# Patient Record
Sex: FEMALE | Race: BLACK OR AFRICAN AMERICAN | NOT HISPANIC OR LATINO
[De-identification: names, ages, dates, MRNs, and addresses within clinical notes are randomized per-mention and may not be internally consistent; named-entity substitution may affect disease eponyms.]

---

## 2019-04-19 ENCOUNTER — ASOB RESULT (OUTPATIENT)
Age: 24
End: 2019-04-19

## 2019-04-19 ENCOUNTER — APPOINTMENT (OUTPATIENT)
Dept: ANTEPARTUM | Facility: CLINIC | Age: 24
End: 2019-04-19
Payer: MEDICAID

## 2019-04-19 PROBLEM — Z00.00 ENCOUNTER FOR PREVENTIVE HEALTH EXAMINATION: Status: ACTIVE | Noted: 2019-04-19

## 2019-04-19 PROCEDURE — 76813 OB US NUCHAL MEAS 1 GEST: CPT

## 2019-04-19 PROCEDURE — 76801 OB US < 14 WKS SINGLE FETUS: CPT

## 2019-06-18 ENCOUNTER — APPOINTMENT (OUTPATIENT)
Dept: ANTEPARTUM | Facility: CLINIC | Age: 24
End: 2019-06-18

## 2019-06-18 ENCOUNTER — ASOB RESULT (OUTPATIENT)
Age: 24
End: 2019-06-18

## 2019-11-03 ENCOUNTER — OUTPATIENT (OUTPATIENT)
Dept: OUTPATIENT SERVICES | Facility: HOSPITAL | Age: 24
LOS: 1 days | End: 2019-11-03
Payer: MEDICAID

## 2019-11-03 ENCOUNTER — OUTPATIENT (OUTPATIENT)
Dept: INPATIENT UNIT | Facility: HOSPITAL | Age: 24
LOS: 1 days | Discharge: HOME | End: 2019-11-03
Payer: MEDICAID

## 2019-11-03 VITALS — HEART RATE: 100 BPM | SYSTOLIC BLOOD PRESSURE: 119 MMHG | DIASTOLIC BLOOD PRESSURE: 71 MMHG

## 2019-11-03 VITALS
SYSTOLIC BLOOD PRESSURE: 119 MMHG | DIASTOLIC BLOOD PRESSURE: 71 MMHG | TEMPERATURE: 98 F | RESPIRATION RATE: 17 BRPM | HEART RATE: 100 BPM

## 2019-11-03 DIAGNOSIS — Z3A.00 WEEKS OF GESTATION OF PREGNANCY NOT SPECIFIED: ICD-10-CM

## 2019-11-03 DIAGNOSIS — O26.899 OTHER SPECIFIED PREGNANCY RELATED CONDITIONS, UNSPECIFIED TRIMESTER: ICD-10-CM

## 2019-11-03 PROCEDURE — 59025 FETAL NON-STRESS TEST: CPT | Mod: 26

## 2019-11-03 PROCEDURE — G0463: CPT

## 2019-11-03 NOTE — OB PROVIDER TRIAGE NOTE - NSHPPHYSICALEXAM_GEN_ALL_CORE
Vital Signs Last 24 Hrs  T(C): 36.9 (03 Nov 2019 21:23), Max: 36.9 (03 Nov 2019 21:23)  T(F): 98.4 (03 Nov 2019 21:23), Max: 98.4 (03 Nov 2019 21:23)  HR: 100 (03 Nov 2019 21:39) (100 - 100)  BP: 119/71 (03 Nov 2019 21:39) (119/71 - 119/71)  RR: 17 (03 Nov 2019 21:23) (17 - 17)    Abdomen soft, nontender     SSE copious pink tinged discharge noted, cervix appears dilated, no active bleeding noted     SVE 1/50/-3    Cat 1 tracing   irreg ctx by toco    TAS cephalic presentation, posterior placenta, michelle 8.64 cm, bpp 10/10

## 2019-11-03 NOTE — OB PROVIDER TRIAGE NOTE - NSOBPROVIDERNOTE_OBGYN_ALL_OB_FT
d/w Dr. Smyth   -Discharge home   -Keep scheduled appt 11/4/19   -Fetal kick count reviewed   -Labor precautions reviewed

## 2019-11-03 NOTE — OB PROVIDER TRIAGE NOTE - HISTORY OF PRESENT ILLNESS
23 yo  40 2/7 weeks with complaints of vaginal spotting x 3. Pt reports pink discharge when she wipes/in toilet. Pt reports going to [facility] yesterday for white vaginal discharge, was ruled out for rupture. Pt reports decreased fetal movements. Denies lof or ctx.     Current a/p complications: Denies     Allergies: NKDA  Medications: PNV  PMH: Denies   PSH: Denies   OB/GYN: Denies   Social: Denies   Psychosocial: Denies

## 2019-11-04 ENCOUNTER — TRANSCRIPTION ENCOUNTER (OUTPATIENT)
Age: 24
End: 2019-11-04

## 2019-11-04 ENCOUNTER — INPATIENT (INPATIENT)
Facility: HOSPITAL | Age: 24
LOS: 1 days | Discharge: HOME | End: 2019-11-06
Attending: OBSTETRICS & GYNECOLOGY | Admitting: OBSTETRICS & GYNECOLOGY

## 2019-11-04 VITALS
TEMPERATURE: 99 F | DIASTOLIC BLOOD PRESSURE: 63 MMHG | SYSTOLIC BLOOD PRESSURE: 128 MMHG | HEART RATE: 100 BPM | RESPIRATION RATE: 16 BRPM

## 2019-11-04 DIAGNOSIS — Z3A.00 WEEKS OF GESTATION OF PREGNANCY NOT SPECIFIED: ICD-10-CM

## 2019-11-04 DIAGNOSIS — O26.899 OTHER SPECIFIED PREGNANCY RELATED CONDITIONS, UNSPECIFIED TRIMESTER: ICD-10-CM

## 2019-11-04 LAB
BASOPHILS # BLD AUTO: 0.02 K/UL — SIGNIFICANT CHANGE UP (ref 0–0.2)
BASOPHILS NFR BLD AUTO: 0.2 % — SIGNIFICANT CHANGE UP (ref 0–2)
BLD GP AB SCN SERPL QL: NEGATIVE — SIGNIFICANT CHANGE UP
EOSINOPHIL # BLD AUTO: 0.03 K/UL — SIGNIFICANT CHANGE UP (ref 0–0.5)
EOSINOPHIL NFR BLD AUTO: 0.3 % — SIGNIFICANT CHANGE UP (ref 0–6)
HCT VFR BLD CALC: 41.7 % — SIGNIFICANT CHANGE UP (ref 34.5–45)
HGB BLD-MCNC: 13.4 G/DL — SIGNIFICANT CHANGE UP (ref 11.5–15.5)
IMM GRANULOCYTES NFR BLD AUTO: 0.4 % — SIGNIFICANT CHANGE UP (ref 0–1.5)
LYMPHOCYTES # BLD AUTO: 1.74 K/UL — SIGNIFICANT CHANGE UP (ref 1–3.3)
LYMPHOCYTES # BLD AUTO: 18.3 % — SIGNIFICANT CHANGE UP (ref 13–44)
MCHC RBC-ENTMCNC: 31.2 PG — SIGNIFICANT CHANGE UP (ref 27–34)
MCHC RBC-ENTMCNC: 32.1 % — SIGNIFICANT CHANGE UP (ref 32–36)
MCV RBC AUTO: 97.2 FL — SIGNIFICANT CHANGE UP (ref 80–100)
MONOCYTES # BLD AUTO: 1.03 K/UL — HIGH (ref 0–0.9)
MONOCYTES NFR BLD AUTO: 10.8 % — SIGNIFICANT CHANGE UP (ref 2–14)
NEUTROPHILS # BLD AUTO: 6.64 K/UL — SIGNIFICANT CHANGE UP (ref 1.8–7.4)
NEUTROPHILS NFR BLD AUTO: 70 % — SIGNIFICANT CHANGE UP (ref 43–77)
NRBC # FLD: 0 K/UL — SIGNIFICANT CHANGE UP (ref 0–0)
PLATELET # BLD AUTO: 235 K/UL — SIGNIFICANT CHANGE UP (ref 150–400)
PMV BLD: 12.2 FL — SIGNIFICANT CHANGE UP (ref 7–13)
RBC # BLD: 4.29 M/UL — SIGNIFICANT CHANGE UP (ref 3.8–5.2)
RBC # FLD: 12.9 % — SIGNIFICANT CHANGE UP (ref 10.3–14.5)
RH IG SCN BLD-IMP: POSITIVE — SIGNIFICANT CHANGE UP
RH IG SCN BLD-IMP: POSITIVE — SIGNIFICANT CHANGE UP
T PALLIDUM AB TITR SER: NEGATIVE — SIGNIFICANT CHANGE UP
WBC # BLD: 9.5 K/UL — SIGNIFICANT CHANGE UP (ref 3.8–10.5)
WBC # FLD AUTO: 9.5 K/UL — SIGNIFICANT CHANGE UP (ref 3.8–10.5)

## 2019-11-04 RX ORDER — OXYTOCIN 10 UNIT/ML
1 VIAL (ML) INJECTION
Qty: 30 | Refills: 0 | Status: DISCONTINUED | OUTPATIENT
Start: 2019-11-04 | End: 2019-11-05

## 2019-11-04 RX ORDER — TETANUS TOXOID, REDUCED DIPHTHERIA TOXOID AND ACELLULAR PERTUSSIS VACCINE, ADSORBED 5; 2.5; 8; 8; 2.5 [IU]/.5ML; [IU]/.5ML; UG/.5ML; UG/.5ML; UG/.5ML
0.5 SUSPENSION INTRAMUSCULAR ONCE
Refills: 0 | Status: DISCONTINUED | OUTPATIENT
Start: 2019-11-04 | End: 2019-11-06

## 2019-11-04 RX ORDER — MAGNESIUM HYDROXIDE 400 MG/1
30 TABLET, CHEWABLE ORAL
Refills: 0 | Status: DISCONTINUED | OUTPATIENT
Start: 2019-11-04 | End: 2019-11-06

## 2019-11-04 RX ORDER — PRAMOXINE HYDROCHLORIDE 150 MG/15G
1 AEROSOL, FOAM RECTAL EVERY 4 HOURS
Refills: 0 | Status: DISCONTINUED | OUTPATIENT
Start: 2019-11-04 | End: 2019-11-06

## 2019-11-04 RX ORDER — SIMETHICONE 80 MG/1
80 TABLET, CHEWABLE ORAL EVERY 4 HOURS
Refills: 0 | Status: DISCONTINUED | OUTPATIENT
Start: 2019-11-04 | End: 2019-11-06

## 2019-11-04 RX ORDER — OXYCODONE HYDROCHLORIDE 5 MG/1
5 TABLET ORAL
Refills: 0 | Status: DISCONTINUED | OUTPATIENT
Start: 2019-11-04 | End: 2019-11-06

## 2019-11-04 RX ORDER — SODIUM CHLORIDE 9 MG/ML
1000 INJECTION, SOLUTION INTRAVENOUS
Refills: 0 | Status: DISCONTINUED | OUTPATIENT
Start: 2019-11-04 | End: 2019-11-04

## 2019-11-04 RX ORDER — AER TRAVELER 0.5 G/1
1 SOLUTION RECTAL; TOPICAL EVERY 4 HOURS
Refills: 0 | Status: DISCONTINUED | OUTPATIENT
Start: 2019-11-04 | End: 2019-11-06

## 2019-11-04 RX ORDER — DIPHENHYDRAMINE HCL 50 MG
25 CAPSULE ORAL EVERY 6 HOURS
Refills: 0 | Status: DISCONTINUED | OUTPATIENT
Start: 2019-11-04 | End: 2019-11-06

## 2019-11-04 RX ORDER — HYDROCORTISONE 1 %
1 OINTMENT (GRAM) TOPICAL EVERY 6 HOURS
Refills: 0 | Status: DISCONTINUED | OUTPATIENT
Start: 2019-11-04 | End: 2019-11-06

## 2019-11-04 RX ORDER — SODIUM CHLORIDE 9 MG/ML
500 INJECTION, SOLUTION INTRAVENOUS ONCE
Refills: 0 | Status: COMPLETED | OUTPATIENT
Start: 2019-11-04 | End: 2019-11-04

## 2019-11-04 RX ORDER — IBUPROFEN 200 MG
600 TABLET ORAL EVERY 6 HOURS
Refills: 0 | Status: COMPLETED | OUTPATIENT
Start: 2019-11-04 | End: 2020-10-02

## 2019-11-04 RX ORDER — SODIUM CHLORIDE 9 MG/ML
1000 INJECTION, SOLUTION INTRAVENOUS ONCE
Refills: 0 | Status: COMPLETED | OUTPATIENT
Start: 2019-11-04 | End: 2019-11-04

## 2019-11-04 RX ORDER — MORPHINE SULFATE 50 MG/1
4 CAPSULE, EXTENDED RELEASE ORAL ONCE
Refills: 0 | Status: DISCONTINUED | OUTPATIENT
Start: 2019-11-04 | End: 2019-11-04

## 2019-11-04 RX ORDER — ACETAMINOPHEN 500 MG
975 TABLET ORAL
Refills: 0 | Status: DISCONTINUED | OUTPATIENT
Start: 2019-11-04 | End: 2019-11-06

## 2019-11-04 RX ORDER — KETOROLAC TROMETHAMINE 30 MG/ML
30 SYRINGE (ML) INJECTION ONCE
Refills: 0 | Status: DISCONTINUED | OUTPATIENT
Start: 2019-11-04 | End: 2019-11-05

## 2019-11-04 RX ORDER — OXYTOCIN 10 UNIT/ML
2 VIAL (ML) INJECTION
Qty: 30 | Refills: 0 | Status: DISCONTINUED | OUTPATIENT
Start: 2019-11-04 | End: 2019-11-04

## 2019-11-04 RX ORDER — SODIUM CHLORIDE 9 MG/ML
3 INJECTION INTRAMUSCULAR; INTRAVENOUS; SUBCUTANEOUS EVERY 8 HOURS
Refills: 0 | Status: DISCONTINUED | OUTPATIENT
Start: 2019-11-04 | End: 2019-11-06

## 2019-11-04 RX ORDER — DIBUCAINE 1 %
1 OINTMENT (GRAM) RECTAL EVERY 6 HOURS
Refills: 0 | Status: DISCONTINUED | OUTPATIENT
Start: 2019-11-04 | End: 2019-11-06

## 2019-11-04 RX ORDER — OXYCODONE HYDROCHLORIDE 5 MG/1
5 TABLET ORAL ONCE
Refills: 0 | Status: DISCONTINUED | OUTPATIENT
Start: 2019-11-04 | End: 2019-11-06

## 2019-11-04 RX ORDER — LANOLIN
1 OINTMENT (GRAM) TOPICAL EVERY 6 HOURS
Refills: 0 | Status: DISCONTINUED | OUTPATIENT
Start: 2019-11-04 | End: 2019-11-06

## 2019-11-04 RX ORDER — OXYTOCIN 10 UNIT/ML
333.33 VIAL (ML) INJECTION
Qty: 20 | Refills: 0 | Status: COMPLETED | OUTPATIENT
Start: 2019-11-04 | End: 2019-11-04

## 2019-11-04 RX ORDER — GLYCERIN ADULT
1 SUPPOSITORY, RECTAL RECTAL AT BEDTIME
Refills: 0 | Status: DISCONTINUED | OUTPATIENT
Start: 2019-11-04 | End: 2019-11-06

## 2019-11-04 RX ORDER — OXYTOCIN 10 UNIT/ML
333.33 VIAL (ML) INJECTION
Qty: 20 | Refills: 0 | Status: DISCONTINUED | OUTPATIENT
Start: 2019-11-04 | End: 2019-11-05

## 2019-11-04 RX ORDER — BENZOCAINE 10 %
1 GEL (GRAM) MUCOUS MEMBRANE EVERY 6 HOURS
Refills: 0 | Status: DISCONTINUED | OUTPATIENT
Start: 2019-11-04 | End: 2019-11-06

## 2019-11-04 RX ORDER — INFLUENZA VIRUS VACCINE 15; 15; 15; 15 UG/.5ML; UG/.5ML; UG/.5ML; UG/.5ML
0.5 SUSPENSION INTRAMUSCULAR ONCE
Refills: 0 | Status: DISCONTINUED | OUTPATIENT
Start: 2019-11-04 | End: 2019-11-06

## 2019-11-04 RX ADMIN — SODIUM CHLORIDE 125 MILLILITER(S): 9 INJECTION, SOLUTION INTRAVENOUS at 10:35

## 2019-11-04 RX ADMIN — SODIUM CHLORIDE 1000 MILLILITER(S): 9 INJECTION, SOLUTION INTRAVENOUS at 23:15

## 2019-11-04 RX ADMIN — SODIUM CHLORIDE 2000 MILLILITER(S): 9 INJECTION, SOLUTION INTRAVENOUS at 17:30

## 2019-11-04 RX ADMIN — MORPHINE SULFATE 4 MILLIGRAM(S): 50 CAPSULE, EXTENDED RELEASE ORAL at 11:02

## 2019-11-04 RX ADMIN — Medication 1 MILLIUNIT(S)/MIN: at 22:24

## 2019-11-04 RX ADMIN — Medication 0.25 MILLIGRAM(S): at 20:25

## 2019-11-04 RX ADMIN — Medication 0.25 MILLIGRAM(S): at 17:30

## 2019-11-04 RX ADMIN — Medication 2 MILLIUNIT(S)/MIN: at 16:06

## 2019-11-04 RX ADMIN — Medication 1000 MILLIUNIT(S)/MIN: at 21:56

## 2019-11-04 RX ADMIN — MORPHINE SULFATE 4 MILLIGRAM(S): 50 CAPSULE, EXTENDED RELEASE ORAL at 11:03

## 2019-11-04 NOTE — OB PROVIDER TRIAGE NOTE - NSOBPROVIDERNOTE_OBGYN_ALL_OB_FT
23 yo P0 @ 40.3 wks SLIUP in early labor  -unsure of pain meds at this time 25 yo P0 @ 40.3 wks SLIUP in prodromal labor; desires IV pain meds  -GBS neg   -pt was dw Dr Almaraz. Pt was approved for morphine

## 2019-11-04 NOTE — CHART NOTE - NSCHARTNOTEFT_GEN_A_CORE
NP note    Pt seen for increased pressure. Requesting morphine.     Vital Signs Last 24 Hrs  T(C): 36.2 (04 Nov 2019 14:11), Max: 37.2 (04 Nov 2019 08:37)  T(F): 97.16 (04 Nov 2019 14:11), Max: 99 (04 Nov 2019 08:37)  HR: 97 (04 Nov 2019 17:33) (78 - 108)  BP: 121/69 (04 Nov 2019 17:03) (104/52 - 128/63)  BP(mean): --  RR: 20 (04 Nov 2019 10:22) (16 - 20)  SpO2: 99% (04 Nov 2019 17:28) (86% - 99%)    Tracing is discontinuous from pt trashing in the bed. Audible decel noted with return to baseline. SVE 5/80/-2  Ctx q2-3min  Dr. Lomeli at bedside.  AROM clear fluid  ISE applied without incident. SVE 6-7/90/-2  Approved for epidural. Pt agreed to epidural.     TIMI olea

## 2019-11-04 NOTE — DISCHARGE NOTE OB - MATERIALS PROVIDED
[facility] Hearing Screen Program/[facility]  Screening Program/Breastfeeding Log/Back To Sleep Handout/  Immunization Record/Bottle Feeding Log/Shaken Baby Prevention Handout/Breastfeeding Guide and Packet/Birth Certificate Instructions/Breastfeeding Mother’s Support Group Information/Guide to Postpartum Care/Vaccinations/Discharge Medication Information for Patients and Families Pocket Guide/Tdap Vaccination (VIS Pub Date: 2012)

## 2019-11-04 NOTE — DISCHARGE NOTE OB - CARE PROVIDER_API CALL
Katy Monroe)  Gynecology Obstetrics  Gynecology  [address] [place] [address]  Phone: [phone]  Fax: [phone]  Follow Up Time:

## 2019-11-04 NOTE — OB PROVIDER H&P - HISTORY OF PRESENT ILLNESS
25yo P0 @ 40.3 wks SLIUP uncomp PNC here co frequent painful ctx's. Pt was here last night and was dc at 1/50/-3. Pt reports GFM and some vaginal bleeding, no LOF.    Pmhx-denies  Pshx/Hosp-denies  Meds-PNV  NKDA  Past ob/Gyn-denies

## 2019-11-04 NOTE — CHART NOTE - NSCHARTNOTEFT_GEN_A_CORE
NP note    Pt opting to ambulate prior to Pitocin initiation. Pt still has painful ctx but does not wish for pain management.   Does not wish to be discharged as this was the second triage visit for ctx pain in the past 24 hours.   Ambulate x 2 hours followed by cervical evaluation as per Dr. Armani olea, NP

## 2019-11-04 NOTE — OB PROVIDER H&P - ASSESSMENT
23 yo P0 @ 40.3 wks SLIUP in prodromal labor; desires IV pain meds  -GBS neg   -pt was dw Dr Almaraz. Pt was approved for morphine

## 2019-11-04 NOTE — OB NEONATOLOGY/PEDIATRICIAN DELIVERY SUMMARY - NSPEDSNEONOTESA_OBGYN_ALL_OB_FT
Baby is a 40.3 week GA M born to a 25 y/o  mother via . Maternal history uncomplicated. Pregnancy with category 2 tracings during delivery. Maternal blood type O+. Prenatal labs HIV neg, HBsAg neg, Rubella immune, RPR nonreactive. GBS - on 10/7. ROM <18hrs with clear fluid. Baby born vigorous and crying spontaneously. Warmed, dried, stimulated, suctioned. Apgars 9 / 9. Breast and bottle feeding, wants HepB and circ.

## 2019-11-04 NOTE — OB RN PATIENT PROFILE - PRO MENTAL HEALTH SX RECENT
Contacted [facility] pharmacy, updated pharmacist as they had questioned her medications. Primarily questioning if she should be on all 3 blood pressure medications. They were updated that lisinopril is now discontinued. No updated scripts needed.    Contacted Irma to inform, left detailed message that scripts for Lopressor and amlodipine are updated and on file at [facility].   none

## 2019-11-04 NOTE — DISCHARGE NOTE OB - PATIENT PORTAL LINK FT
You can access the FollowMyHealth Patient Portal offered by [facility] by registering at the following website: [email or web address]. By joining [facility]’s FollowMyHealth portal, you will also be able to view your health information using other applications (apps) compatible with our system.

## 2019-11-04 NOTE — OB PROVIDER DELIVERY SUMMARY - NSPROVIDERDELIVERYNOTE_OBGYN_ALL_OB_FT
Spontaneous vaginal delivery of liveborn infant from CHRISTIAN position. Head, shoulders, and body delivered easily. Infant was suctioned. No mec. Cord was clamped and cut immediately and infant was passed to mother. Placenta delivered intact with a 3 vessel cord. Fundal massage was given and uterine fundus was found to be firm. Vaginal exam revealed an intact cervix, vaginal walls and sulci. Patient had a 2nd degree laceration in the perineum that was repaired with 2.0 chromic suture. Excellent hemostasis was noted. Patient was stable and went to recovery. Count was correct x 2.

## 2019-11-04 NOTE — OB RN DELIVERY SUMMARY - NS_SEPSISRSKCALC_OBGYN_ALL_OB_FT
EOS calculated successfully. EOS Risk Factor: 0.5/1000 live births ([facility] national incidence); GA=40w3d; Temp=99.68; ROM=4.083; GBS='Unknown'; Antibiotics='No antibiotics or any antibiotics < 2 hrs prior to birth'

## 2019-11-04 NOTE — CHART NOTE - NSCHARTNOTEFT_GEN_A_CORE
NP note    Pt seen for cervical evaluation. Had some relief from the morphine.     Vital Signs Last 24 Hrs  T(C): 36.7 (04 Nov 2019 10:22), Max: 37.2 (04 Nov 2019 08:37)  T(F): 98.1 (04 Nov 2019 10:22), Max: 99 (04 Nov 2019 08:37)  HR: 91 (04 Nov 2019 13:11) (78 - 104)  BP: 104/52 (04 Nov 2019 12:04) (104/52 - 128/63)  BP(mean): --  RR: 20 (04 Nov 2019 10:22) (16 - 20)  SpO2: 95% (04 Nov 2019 13:11) (92% - 96%)    EFM Cat I   [place] irr  SVE 3/80/-3    -For pitocin as per Dr. Armani olea, NP

## 2019-11-04 NOTE — CHART NOTE - NSCHARTNOTEFT_GEN_A_CORE
Patient seen and examined at bedside for prolonged deceleration. VE: 10/100/+1, pitocin held, moved into right and then left lateral positions. Terbutaline x1 given. Tracing recovered to baseline. Will allow tracing to recover for 10 minutes and then start pushing.

## 2019-11-04 NOTE — OB RN PATIENT PROFILE - BREASTFEEDING PROVIDES STABLE TEMPERATURE THROUGH SKIN TO SKIN CONTACT
Low acid diet
For information on Fall & Injury Prevention, visit [email or web address]
Statement Selected

## 2019-11-04 NOTE — CHART NOTE - NSCHARTNOTEFT_GEN_A_CORE
Patient seen and examined at bedside. s/p epidural placement. Noted to have late decelerations with last three contractions. Contractions q5-6 minutes. VE: 10/100/-1, IUPC/ISE in place. Moved in right lateral position with peanut ball, allow to labor down. Restart pitocin if tracing Cat 1.

## 2019-11-04 NOTE — OB PROVIDER TRIAGE NOTE - HISTORY OF PRESENT ILLNESS
23yo P0 @ 40.3 wks SLIUP uncomp PNC here co frequent painful ctx's. Pt was here last night and was dc at 1/50/-3. Pt reports GFM and some vaginal bleeding, no LOF.    Pmhx-denies  Pshx/Hosp-denies  Meds-PNV  NKDA  Past ob/Gyn-denies

## 2019-11-04 NOTE — OB PROVIDER IHI INDUCTION/AUGMENTATION NOTE - NS_CHECKALL_OBGYN_ALL_OB
Induction / Augmentation was discussed/H&P was completed/Contractions pattern was reviewed/Order was written/FHR was reviewed

## 2019-11-04 NOTE — CHART NOTE - NSCHARTNOTEFT_GEN_A_CORE
Patient seen and examined at bedside for prolonged deceleration. Pitocin held. VE: 6.5cm, ISE placed. Recovered to baseline however had subsequent late vs. variable decelerations, contractions not picking up on tocometer. Terbutaline x1 given. IV bolus given.  VE: 9.5/100/-1, IUPC placed. Tracing improved with no decelerations with last two contractions. Anesthesia in room to place epidural. If variable decelerations with start amnioinfusion. Patient seen and examined at bedside for prolonged deceleration. Pitocin held. VE: 6.5cm, AROM, ISE placed. Recovered to baseline however had subsequent late vs. variable decelerations, contractions not picking up on tocometer. Terbutaline x1 given. IV bolus given.  VE: 9.5/100/-1, IUPC placed. Tracing improved with no decelerations with last two contractions. Anesthesia in room to place epidural. If variable decelerations with start amnioinfusion.

## 2019-11-05 RX ORDER — IBUPROFEN 200 MG
1 TABLET ORAL
Qty: 0 | Refills: 0 | DISCHARGE
Start: 2019-11-05

## 2019-11-05 RX ORDER — CHOLECALCIFEROL (VITAMIN D3) 125 MCG
0 CAPSULE ORAL
Qty: 0 | Refills: 0 | DISCHARGE

## 2019-11-05 RX ORDER — IBUPROFEN 200 MG
600 TABLET ORAL EVERY 6 HOURS
Refills: 0 | Status: DISCONTINUED | OUTPATIENT
Start: 2019-11-05 | End: 2019-11-06

## 2019-11-05 RX ORDER — ACETAMINOPHEN 500 MG
3 TABLET ORAL
Qty: 0 | Refills: 0 | DISCHARGE
Start: 2019-11-05

## 2019-11-05 RX ADMIN — Medication 600 MILLIGRAM(S): at 17:11

## 2019-11-05 RX ADMIN — SODIUM CHLORIDE 3 MILLILITER(S): 9 INJECTION INTRAMUSCULAR; INTRAVENOUS; SUBCUTANEOUS at 17:03

## 2019-11-05 RX ADMIN — Medication 600 MILLIGRAM(S): at 09:31

## 2019-11-05 RX ADMIN — Medication 600 MILLIGRAM(S): at 18:00

## 2019-11-05 RX ADMIN — SODIUM CHLORIDE 3 MILLILITER(S): 9 INJECTION INTRAMUSCULAR; INTRAVENOUS; SUBCUTANEOUS at 22:38

## 2019-11-05 RX ADMIN — Medication 600 MILLIGRAM(S): at 10:30

## 2019-11-05 RX ADMIN — SODIUM CHLORIDE 3 MILLILITER(S): 9 INJECTION INTRAMUSCULAR; INTRAVENOUS; SUBCUTANEOUS at 06:31

## 2019-11-05 NOTE — PROGRESS NOTE ADULT - SUBJECTIVE AND OBJECTIVE BOX
Post-partum Note,   She is a  24y woman who is now post-partum day: 1    Subjective:  The patient feels well.  She is ambulating.   She is tolerating regular diet.  She denies nausea and vomiting; denies fever.  She is voiding.  Her pain is controlled.  She reports normal postpartum bleeding.  She is breastfeeding and formula feeding.    Physical exam:    Vital Signs Last 24 Hrs  T(C): 37 (2019 05:36), Max: 37.6 (2019 20:02)  T(F): 98.6 (2019 05:36), Max: 99.68 (2019 20:02)  HR: 98 (2019 05:36) (78 - 155)  BP: 128/83 (2019 05:36) (103/54 - 139/67)  BP(mean): --  RR: 16 (2019 05:36) (16 - 18)  SpO2: 100% (2019 05:36) (83% - 100%)    Gen: NAD  Breast: Soft, nontender, not engorged.  Abdomen: Soft, nontender, no distension , firm uterine fundus at umbilicus.  Pelvic: Normal lochia noted  Ext: No calf tenderness    LABS:                        13.4   9.50  )-----------( 235      ( 2019 10:10 )             41.7       Rubella status:     Allergies    No Known Allergies    Intolerances      MEDICATIONS  (STANDING):  acetaminophen   Tablet .. 975 milliGRAM(s) Oral <User Schedule>  diphtheria/tetanus/pertussis (acellular) Vaccine (ADAcel) 0.5 milliLiter(s) IntraMuscular once  ibuprofen  Tablet. 600 milliGRAM(s) Oral every 6 hours  influenza   Vaccine 0.5 milliLiter(s) IntraMuscular once  prenatal multivitamin 1 Tablet(s) Oral daily  sodium chloride 0.9% lock flush 3 milliLiter(s) IV Push every 8 hours    MEDICATIONS  (PRN):  benzocaine 20%/menthol 0.5% Spray 1 Spray(s) Topical every 6 hours PRN for Perineal discomfort  dibucaine 1% Ointment 1 Application(s) Topical every 6 hours PRN Perineal discomfort  diphenhydrAMINE 25 milliGRAM(s) Oral every 6 hours PRN Pruritus  glycerin Suppository - Adult 1 Suppository(s) Rectal at bedtime PRN Constipation  hydrocortisone 1% Cream 1 Application(s) Topical every 6 hours PRN Moderate Pain (4-6)  lanolin Ointment 1 Application(s) Topical every 6 hours PRN nipple soreness  magnesium hydroxide Suspension 30 milliLiter(s) Oral two times a day PRN Constipation  oxyCODONE    IR 5 milliGRAM(s) Oral every 3 hours PRN Moderate to Severe Pain (4-10)  oxyCODONE    IR 5 milliGRAM(s) Oral once PRN Moderate to Severe Pain (4-10)  pramoxine 1%/zinc 5% Cream 1 Application(s) Topical every 4 hours PRN Moderate Pain (4-6)  simethicone 80 milliGRAM(s) Chew every 4 hours PRN Gas  witch hazel Pads 1 Application(s) Topical every 4 hours PRN Perineal discomfort        Assessment and Plan  PPD #1 s/p   Doing well.  Encourage ambulation.  PP & PPD Instructions reviewed.  [facility].  D/C home tomorrow.

## 2019-11-05 NOTE — PROGRESS NOTE ADULT - SUBJECTIVE AND OBJECTIVE BOX
INTERVAL HPI/OVERNIGHT EVENTS:  24y Female s/p labor epidural      Vital Signs Last 24 Hrs  T(C): 37 (05 Nov 2019 05:36), Max: 37.6 (04 Nov 2019 20:02)  T(F): 98.6 (05 Nov 2019 05:36), Max: 99.68 (04 Nov 2019 20:02)  HR: 98 (05 Nov 2019 05:36) (78 - 155)  BP: 128/83 (05 Nov 2019 05:36) (103/54 - 139/67)  BP(mean): --  RR: 16 (05 Nov 2019 05:36) (16 - 20)  SpO2: 100% (05 Nov 2019 05:36) (83% - 100%)    Patient seen, doing well, no headache, no residual numbness or weakness, no anesthetic complications or complaints noted or reported.

## 2019-11-06 VITALS
TEMPERATURE: 99 F | SYSTOLIC BLOOD PRESSURE: 121 MMHG | RESPIRATION RATE: 16 BRPM | HEART RATE: 90 BPM | DIASTOLIC BLOOD PRESSURE: 69 MMHG | OXYGEN SATURATION: 100 %

## 2019-11-06 RX ADMIN — SODIUM CHLORIDE 3 MILLILITER(S): 9 INJECTION INTRAMUSCULAR; INTRAVENOUS; SUBCUTANEOUS at 05:56

## 2019-11-06 RX ADMIN — Medication 600 MILLIGRAM(S): at 06:53

## 2019-11-06 RX ADMIN — Medication 600 MILLIGRAM(S): at 05:53

## 2020-02-19 NOTE — OB RN PATIENT PROFILE - PRO RUBELLA INFANT
Problem: Anemia  Goal: Anemia Symptom Improvement  Outcome: Ongoing, Progressing  Intervention: Monitor and Manage Anemia  Flowsheets (Taken 2/19/2020 1255)  Oral Nutrition Promotion: rest periods promoted  Fatigue Management: frequent rest breaks encouraged     
immune

## 2021-07-17 ENCOUNTER — APPOINTMENT (OUTPATIENT)
Dept: ANTEPARTUM | Facility: CLINIC | Age: 26
End: 2021-07-17
Payer: MEDICAID

## 2021-07-17 ENCOUNTER — ASOB RESULT (OUTPATIENT)
Age: 26
End: 2021-07-17

## 2021-07-17 PROCEDURE — 76813 OB US NUCHAL MEAS 1 GEST: CPT

## 2021-07-17 PROCEDURE — 76801 OB US < 14 WKS SINGLE FETUS: CPT

## 2021-10-11 ENCOUNTER — ASOB RESULT (OUTPATIENT)
Age: 26
End: 2021-10-11

## 2021-10-11 ENCOUNTER — APPOINTMENT (OUTPATIENT)
Dept: ANTEPARTUM | Facility: CLINIC | Age: 26
End: 2021-10-11
Payer: MEDICAID

## 2021-10-11 PROCEDURE — 76811 OB US DETAILED SNGL FETUS: CPT

## 2022-01-22 ENCOUNTER — OUTPATIENT (OUTPATIENT)
Dept: INPATIENT UNIT | Facility: HOSPITAL | Age: 27
LOS: 1 days | Discharge: HOME | End: 2022-01-22
Payer: MEDICAID

## 2022-01-22 VITALS
SYSTOLIC BLOOD PRESSURE: 117 MMHG | DIASTOLIC BLOOD PRESSURE: 58 MMHG | HEART RATE: 105 BPM | TEMPERATURE: 98 F | RESPIRATION RATE: 14 BRPM

## 2022-01-22 VITALS — HEART RATE: 93 BPM | SYSTOLIC BLOOD PRESSURE: 115 MMHG | DIASTOLIC BLOOD PRESSURE: 59 MMHG

## 2022-01-22 DIAGNOSIS — Z3A.00 WEEKS OF GESTATION OF PREGNANCY NOT SPECIFIED: ICD-10-CM

## 2022-01-22 DIAGNOSIS — O26.899 OTHER SPECIFIED PREGNANCY RELATED CONDITIONS, UNSPECIFIED TRIMESTER: ICD-10-CM

## 2022-01-22 PROCEDURE — 76818 FETAL BIOPHYS PROFILE W/NST: CPT | Mod: 26

## 2022-01-22 PROCEDURE — 99212 OFFICE O/P EST SF 10 MIN: CPT

## 2022-01-22 NOTE — OB PROVIDER TRIAGE NOTE - PLAN OF CARE
D/C Home  D/W Dr. Panda   No evidence of acute process  Normal fetal testing  No evidence of PROM at this time  Follow up at next scheduled prenatal appointment  Return for decreased fetal movement, loss of fluid or vaginal bleeding  Increase oral hydration  Signs and Symptoms of Labor reviewed

## 2022-01-22 NOTE — OB PROVIDER TRIAGE NOTE - NS_AMNIOTICAMT_OBGYN_ALL_OB
Last visit 7/10/19, no future visit    Prescription approved per [facility] Refill Protocol.  Renee Hill RN  [facility]   No Pooling

## 2022-01-22 NOTE — OB RN TRIAGE NOTE - FALL HARM RISK - UNIVERSAL INTERVENTIONS
Bed in lowest position, wheels locked, appropriate side rails in place/Call bell, personal items and telephone in reach/Instruct patient to call for assistance before getting out of bed or chair/Non-slip footwear when patient is out of bed/[place] to call system/Physically safe environment - no spills, clutter or unnecessary equipment/Purposeful Proactive Rounding/Room/bathroom lighting operational, light cord in reach

## 2022-01-22 NOTE — OB PROVIDER TRIAGE NOTE - NSHPPHYSICALEXAM_GEN_ALL_CORE
Vital Signs Last 24 Hrs  T(C): --  T(F): --  HR: 105 (22 Jan 2022 18:02) (105 - 105)  BP: 117/58 (22 Jan 2022 18:02) (117/58 - 117/58)  BP(mean): --  RR: --  SpO2: --    VE:  spec: Vital Signs Last 24 Hrs  T(C): --  T(F): --  HR: 105 (22 Jan 2022 18:02) (105 - 105)  BP: 117/58 (22 Jan 2022 18:02) (117/58 - 117/58)  BP(mean): --  RR: --  SpO2: --  VSS: Reviewed  Abd soft, nontender, gravid  VE: 2/50/-2  spec: mucousy vaginal discharge, no pooling, nitrazine negative  Fern negative (Reviewed joan Allen)  Sono: Cephalic presentation, BPP 8/8, fundal placenta, RACHELL 12.27  NST: cat I FHRT, mod variability, + accels< mild intermittent ctx noted on toco

## 2022-01-22 NOTE — OB PROVIDER TRIAGE NOTE - NSOBPROVIDERNOTE_OBGYN_ALL_OB_FT
26 yr old  @ 39.4 with complaints of SROM and mucousy   - NST: cat I FHRT  - sonogram : BPP /, Vtx  - spec: fern negative, nitrazine negative, no pooling  - Awaiting dispo from Dr Panda, no evidence of ROM

## 2022-01-22 NOTE — OB PROVIDER TRIAGE NOTE - ADDITIONAL INSTRUCTIONS
Follow up at next scheduled prenatal appointment  Return for decreased fetal movement, loss of fluid or vaginal bleeding  Increase oral hydration  Signs and Symptoms of Labor reviewed

## 2022-01-22 NOTE — OB PROVIDER TRIAGE NOTE - HISTORY OF PRESENT ILLNESS
26 yr old  @ 39.4 with complaints of SROM 26 yr old  @ 39.4 with complaints of SROM and mucousy discharge. PT reports poss leaking of fluid since 0. Reports good FM, Denies cramping ctx LOF or vaginal bleeding, Denies HA dizziness blurry vision, or epigastric pain, Pain scale 0/10.     A/p Course complicated by     NONCOMPLIANT PNC PT WAS IN [facility] FOR MONTH - MISSED APPTS   OBESITY   ·	Pt never went to for early GCT  ·	On ASA 162mg  GESTATIONAL PROTEINURIA 483 21 (WNL 10/21/21)  ·	Monitor BPs   ·	PEC BW 11/15 LFTS WNL    pmhx: obesity  obhx;  full term  gynhx: denies  NKDA  Med: PNV, ASA

## 2022-01-23 ENCOUNTER — INPATIENT (INPATIENT)
Facility: HOSPITAL | Age: 27
LOS: 1 days | Discharge: HOME | End: 2022-01-25
Attending: OBSTETRICS & GYNECOLOGY | Admitting: OBSTETRICS & GYNECOLOGY

## 2022-01-23 ENCOUNTER — TRANSCRIPTION ENCOUNTER (OUTPATIENT)
Age: 27
End: 2022-01-23

## 2022-01-23 VITALS
TEMPERATURE: 99 F | RESPIRATION RATE: 16 BRPM | HEART RATE: 97 BPM | SYSTOLIC BLOOD PRESSURE: 125 MMHG | DIASTOLIC BLOOD PRESSURE: 58 MMHG

## 2022-01-23 DIAGNOSIS — O26.899 OTHER SPECIFIED PREGNANCY RELATED CONDITIONS, UNSPECIFIED TRIMESTER: ICD-10-CM

## 2022-01-23 DIAGNOSIS — Z3A.00 WEEKS OF GESTATION OF PREGNANCY NOT SPECIFIED: ICD-10-CM

## 2022-01-23 LAB
BASOPHILS # BLD AUTO: 0.03 K/UL — SIGNIFICANT CHANGE UP (ref 0–0.2)
BASOPHILS NFR BLD AUTO: 0.4 % — SIGNIFICANT CHANGE UP (ref 0–2)
BLD GP AB SCN SERPL QL: NEGATIVE — SIGNIFICANT CHANGE UP
COVID-19 SPIKE DOMAIN AB INTERP: POSITIVE
COVID-19 SPIKE DOMAIN ANTIBODY RESULT: >250 U/ML — HIGH
EOSINOPHIL # BLD AUTO: 0.05 K/UL — SIGNIFICANT CHANGE UP (ref 0–0.5)
EOSINOPHIL NFR BLD AUTO: 0.6 % — SIGNIFICANT CHANGE UP (ref 0–6)
HCT VFR BLD CALC: 35.2 % — SIGNIFICANT CHANGE UP (ref 34.5–45)
HGB BLD-MCNC: 11.3 G/DL — LOW (ref 11.5–15.5)
IANC: 4.84 K/UL — SIGNIFICANT CHANGE UP (ref 1.5–8.5)
IMM GRANULOCYTES NFR BLD AUTO: 0.4 % — SIGNIFICANT CHANGE UP (ref 0–1.5)
LYMPHOCYTES # BLD AUTO: 1.82 K/UL — SIGNIFICANT CHANGE UP (ref 1–3.3)
LYMPHOCYTES # BLD AUTO: 23 % — SIGNIFICANT CHANGE UP (ref 13–44)
MCHC RBC-ENTMCNC: 31.6 PG — SIGNIFICANT CHANGE UP (ref 27–34)
MCHC RBC-ENTMCNC: 32.1 GM/DL — SIGNIFICANT CHANGE UP (ref 32–36)
MCV RBC AUTO: 98.3 FL — SIGNIFICANT CHANGE UP (ref 80–100)
MONOCYTES # BLD AUTO: 1.16 K/UL — HIGH (ref 0–0.9)
MONOCYTES NFR BLD AUTO: 14.6 % — HIGH (ref 2–14)
NEUTROPHILS # BLD AUTO: 4.84 K/UL — SIGNIFICANT CHANGE UP (ref 1.8–7.4)
NEUTROPHILS NFR BLD AUTO: 61 % — SIGNIFICANT CHANGE UP (ref 43–77)
NRBC # BLD: 0 /100 WBCS — SIGNIFICANT CHANGE UP
NRBC # FLD: 0 K/UL — SIGNIFICANT CHANGE UP
PLATELET # BLD AUTO: 233 K/UL — SIGNIFICANT CHANGE UP (ref 150–400)
RBC # BLD: 3.58 M/UL — LOW (ref 3.8–5.2)
RBC # FLD: 12.9 % — SIGNIFICANT CHANGE UP (ref 10.3–14.5)
RH IG SCN BLD-IMP: POSITIVE — SIGNIFICANT CHANGE UP
SARS-COV-2 IGG+IGM SERPL QL IA: >250 U/ML — HIGH
SARS-COV-2 IGG+IGM SERPL QL IA: POSITIVE
SARS-COV-2 RNA SPEC QL NAA+PROBE: SIGNIFICANT CHANGE UP
WBC # BLD: 7.93 K/UL — SIGNIFICANT CHANGE UP (ref 3.8–10.5)
WBC # FLD AUTO: 7.93 K/UL — SIGNIFICANT CHANGE UP (ref 3.8–10.5)

## 2022-01-23 RX ORDER — SODIUM CHLORIDE 9 MG/ML
1000 INJECTION, SOLUTION INTRAVENOUS
Refills: 0 | Status: DISCONTINUED | OUTPATIENT
Start: 2022-01-23 | End: 2022-01-23

## 2022-01-23 RX ORDER — BENZOCAINE 10 %
1 GEL (GRAM) MUCOUS MEMBRANE EVERY 6 HOURS
Refills: 0 | Status: DISCONTINUED | OUTPATIENT
Start: 2022-01-23 | End: 2022-01-25

## 2022-01-23 RX ORDER — IBUPROFEN 200 MG
600 TABLET ORAL EVERY 6 HOURS
Refills: 0 | Status: COMPLETED | OUTPATIENT
Start: 2022-01-23 | End: 2022-12-22

## 2022-01-23 RX ORDER — MAGNESIUM HYDROXIDE 400 MG/1
30 TABLET, CHEWABLE ORAL
Refills: 0 | Status: DISCONTINUED | OUTPATIENT
Start: 2022-01-23 | End: 2022-01-25

## 2022-01-23 RX ORDER — OXYCODONE HYDROCHLORIDE 5 MG/1
5 TABLET ORAL
Refills: 0 | Status: DISCONTINUED | OUTPATIENT
Start: 2022-01-23 | End: 2022-01-25

## 2022-01-23 RX ORDER — HYDROCORTISONE 1 %
1 OINTMENT (GRAM) TOPICAL EVERY 6 HOURS
Refills: 0 | Status: DISCONTINUED | OUTPATIENT
Start: 2022-01-23 | End: 2022-01-25

## 2022-01-23 RX ORDER — DIPHENHYDRAMINE HCL 50 MG
25 CAPSULE ORAL EVERY 6 HOURS
Refills: 0 | Status: DISCONTINUED | OUTPATIENT
Start: 2022-01-23 | End: 2022-01-25

## 2022-01-23 RX ORDER — TETANUS TOXOID, REDUCED DIPHTHERIA TOXOID AND ACELLULAR PERTUSSIS VACCINE, ADSORBED 5; 2.5; 8; 8; 2.5 [IU]/.5ML; [IU]/.5ML; UG/.5ML; UG/.5ML; UG/.5ML
0.5 SUSPENSION INTRAMUSCULAR ONCE
Refills: 0 | Status: DISCONTINUED | OUTPATIENT
Start: 2022-01-23 | End: 2022-01-25

## 2022-01-23 RX ORDER — OXYCODONE HYDROCHLORIDE 5 MG/1
5 TABLET ORAL ONCE
Refills: 0 | Status: DISCONTINUED | OUTPATIENT
Start: 2022-01-23 | End: 2022-01-25

## 2022-01-23 RX ORDER — OXYTOCIN 10 UNIT/ML
333.33 VIAL (ML) INJECTION
Qty: 20 | Refills: 0 | Status: DISCONTINUED | OUTPATIENT
Start: 2022-01-23 | End: 2022-01-24

## 2022-01-23 RX ORDER — SODIUM CHLORIDE 9 MG/ML
3 INJECTION INTRAMUSCULAR; INTRAVENOUS; SUBCUTANEOUS EVERY 8 HOURS
Refills: 0 | Status: DISCONTINUED | OUTPATIENT
Start: 2022-01-23 | End: 2022-01-25

## 2022-01-23 RX ORDER — PRAMOXINE HYDROCHLORIDE 150 MG/15G
1 AEROSOL, FOAM RECTAL EVERY 4 HOURS
Refills: 0 | Status: DISCONTINUED | OUTPATIENT
Start: 2022-01-23 | End: 2022-01-25

## 2022-01-23 RX ORDER — DIBUCAINE 1 %
1 OINTMENT (GRAM) RECTAL EVERY 6 HOURS
Refills: 0 | Status: DISCONTINUED | OUTPATIENT
Start: 2022-01-23 | End: 2022-01-25

## 2022-01-23 RX ORDER — KETOROLAC TROMETHAMINE 30 MG/ML
30 SYRINGE (ML) INJECTION ONCE
Refills: 0 | Status: DISCONTINUED | OUTPATIENT
Start: 2022-01-23 | End: 2022-01-23

## 2022-01-23 RX ORDER — AER TRAVELER 0.5 G/1
1 SOLUTION RECTAL; TOPICAL EVERY 4 HOURS
Refills: 0 | Status: DISCONTINUED | OUTPATIENT
Start: 2022-01-23 | End: 2022-01-25

## 2022-01-23 RX ORDER — SIMETHICONE 80 MG/1
80 TABLET, CHEWABLE ORAL EVERY 4 HOURS
Refills: 0 | Status: DISCONTINUED | OUTPATIENT
Start: 2022-01-23 | End: 2022-01-25

## 2022-01-23 RX ORDER — LANOLIN
1 OINTMENT (GRAM) TOPICAL EVERY 6 HOURS
Refills: 0 | Status: DISCONTINUED | OUTPATIENT
Start: 2022-01-23 | End: 2022-01-25

## 2022-01-23 RX ORDER — ACETAMINOPHEN 500 MG
975 TABLET ORAL
Refills: 0 | Status: DISCONTINUED | OUTPATIENT
Start: 2022-01-23 | End: 2022-01-25

## 2022-01-23 RX ADMIN — SODIUM CHLORIDE 3 MILLILITER(S): 9 INJECTION INTRAMUSCULAR; INTRAVENOUS; SUBCUTANEOUS at 22:00

## 2022-01-23 RX ADMIN — Medication 30 MILLIGRAM(S): at 18:35

## 2022-01-23 RX ADMIN — Medication 1000 MILLIUNIT(S)/MIN: at 17:30

## 2022-01-23 RX ADMIN — Medication 30 MILLIGRAM(S): at 19:55

## 2022-01-23 NOTE — OB RN DELIVERY SUMMARY - NS_SEPSISRSKCALC_OBGYN_ALL_OB_FT
EOS calculated successfully. EOS Risk Factor: 0.5/1000 live births ([facility] national incidence); GA=39w5d; Temp=98.6; ROM=0.233; GBS='Negative'; Antibiotics='No antibiotics or any antibiotics < 2 hrs prior to birth'

## 2022-01-23 NOTE — OB PROVIDER H&P - HISTORY OF PRESENT ILLNESS
PNC Provider;  Dr Mary  EDC:  2022  Patient is a 27y/o  @ 39 5/7wks gest who reports to triage with c/o contractions q 5min since 0600.  Denies loss of fluis or vagina bleeding.  Reports pain of 8/10 with contractions.  Reports good fetal movements.  AP Course:  gest. proteinuria.    Meds:  pnv, vit d and [id number]  NKDA  Denies prior covid-19 infection or vaccination  PSHx/GYN/Psych - denies  PMHx - obesity  SH - spouse lives in [facility] [place] pt travels back and forth.  Patient explanation as to why she missed some of her prenatal appointment          Labor support person is a friend.  OBHx -  - 2019 - 8+ lbs

## 2022-01-23 NOTE — DISCHARGE NOTE OB - CARE PLAN
Principal Discharge DX:	Vaginal delivery  Assessment and plan of treatment:	Routine postpartum care   1

## 2022-01-23 NOTE — OB PROVIDER TRIAGE NOTE - NSHPPHYSICALEXAM_GEN_ALL_CORE
Vital Signs Last 24 Hrs  T(C): 37 (23 Jan 2022 11:05), Max: 37 (23 Jan 2022 11:05)  T(F): 98.6 (23 Jan 2022 11:05), Max: 98.6 (23 Jan 2022 11:05)  HR: 88 (23 Jan 2022 12:40) (88 - 105)  BP: 120/64 (23 Jan 2022 12:40) (112/59 - 125/58)  BP(mean): --  RR: 16 (23 Jan 2022 11:05) (14 - 16)  SpO2: --    Abdomen gravid, soft and nontender  Continue EFM  SVE - 3.5/70/-3 Intact  TAS - Cephalic presentation  GBS - negative  Clinical EFW - 3200G

## 2022-01-23 NOTE — DISCHARGE NOTE OB - NS MD DC FALL RISK RISK
For information on Fall & Injury Prevention, visit: [email or web address] OR  [email or web address] OR  https://www.cdc.gov/steadi/patient.html

## 2022-01-23 NOTE — DISCHARGE NOTE OB - CARE PROVIDERS DIRECT ADDRESSES
,DirectAddress_Unknown ,DirectAddress_Unknown,[email or web address]

## 2022-01-23 NOTE — OB RN PATIENT PROFILE - AS SC BRADEN SENSORY
Initiate Treatment: triamcinolone acetonide 0.1 % topical cream BID\\nDays Supply: 30\\nSig: Apply thin layer to areas of eczema on the hands twice daily x2 weeks on, 1 week off. Repeat cycle as needed.\\n\\nhydrocortisone 2.5 % topical cream BID\\nDays Supply: 30\\nSig: Apply thin layer to affected areas of face twice daily x 2 weeks on 1 week off. Repeat cycle as needed. Plan: Advised condition is to be treated not cured, and that moisturizing is the key. Advised moisturizer after every hand washing and multiple times daily to hands and face. Explained that the PIH can remain after the flares have resolved. Will use the HCQ for the face and TAC on the hands. Otc Regimen: Neutrogena Norwegian hand formula Detail Level: Zone (4) no impairment

## 2022-01-23 NOTE — DISCHARGE NOTE OB - CARE PROVIDER_API CALL
Katy Monroe)  Obstetrics and Gynecology  [address] [place] [address]  Phone: [phone]  Fax: [phone]  Follow Up Time:    Katy Monroe)  Obstetrics and Gynecology  [address] [place] [address]  Phone: [phone]  Fax: [phone]  Follow Up Time:     Heriberto Tate)  Pediatric Urology; Urology  [address] [facility] [place] [address]  Phone: [phone]  Fax: [phone]  Follow Up Time: 1 week    Parmjit Tate)  Pediatric Urology; Urology  [address], [address]  [place] [address]  Phone: [phone]  Fax: [phone]  Follow Up Time: 1 week

## 2022-01-23 NOTE — OB RN TRIAGE NOTE - FALL HARM RISK - UNIVERSAL INTERVENTIONS
Bed in lowest position, wheels locked, appropriate side rails in place/Call bell, personal items and telephone in reach/Instruct patient to call for assistance before getting out of bed or chair/Non-slip footwear when patient is out of bed/[place] to call system/Physically safe environment - no spills, clutter or unnecessary equipment/Purposeful Proactive Rounding/Room/bathroom lighting operational, light cord in reach

## 2022-01-23 NOTE — OB RN DELIVERY SUMMARY - NSSELHIDDEN_OBGYN_ALL_OB_FT
[NS_DeliveryAttending1_OBGYN_ALL_OB_FT:[id number]],[NS_DeliveryAssist1_OBGYN_ALL_OB_FT:[id number]],[NS_DeliveryRN_OBGYN_ALL_OB_FT:[id number]=]

## 2022-01-23 NOTE — OB PROVIDER TRIAGE NOTE - NSOBPROVIDERNOTE_OBGYN_ALL_OB_FT
Patient is a 27y/o  @ 39 5/7wks gest in early labor  GBS negative  Discussed findings with Dr Almaraz  Plan:  admit for expectant managements.

## 2022-01-23 NOTE — DISCHARGE NOTE OB - MEDICATION SUMMARY - MEDICATIONS TO TAKE
I will START or STAY ON the medications listed below when I get home from the hospital:    ibuprofen 600 mg oral tablet  -- 1 tab(s) by mouth every 6 hours, As Needed  -- Indication: For pain    acetaminophen 325 mg oral tablet  -- 3 tab(s) by mouth every 6 hours, As Needed  -- Indication: For pain

## 2022-01-23 NOTE — OB RN PATIENT PROFILE - FALL HARM RISK - UNIVERSAL INTERVENTIONS
Bed in lowest position, wheels locked, appropriate side rails in place/Call bell, personal items and telephone in reach/Instruct patient to call for assistance before getting out of bed or chair/Non-slip footwear when patient is out of bed/[place] to call system/Physically safe environment - no spills, clutter or unnecessary equipment/Purposeful Proactive Rounding/Room/bathroom lighting operational, light cord in reach

## 2022-01-23 NOTE — DISCHARGE NOTE OB - PROVIDER TOKENS
PROVIDER:[TOKEN:[[id number]]] PROVIDER:[TOKEN:[[id number]]],PROVIDER:[TOKEN:[[id number]],FOLLOWUP:[1 week]],PROVIDER:[TOKEN:[[id number]],FOLLOWUP:[1 week]]

## 2022-01-23 NOTE — OB RN PATIENT PROFILE - NS PRO ABUSE SCREEN AFRAID ANYONE YN
Regional Procedure  Technique:  Spinal  Labor and Delivery Epidural:  No    Patient Preparation  Location: Operating Room  Preanesthetic Checklist:  Patient Identified, Risks and Benefits Discussed, IV Bolus - Crystalloid, Patient &/Or Fetus Hemodynamically Stable, Monitors and Equipment Checked, Timeout Performed and Post-Op Pain Mgt at Surgeon Request  Patient Position:  Sitting  Sedation:  None    Monitors Used:  NIBP, Pulse Oximetry, FHT's and EKG  Oxygen Supplement:  Room Air  Prep:  Chloraprep, Sterile Drape, Cap, Mask and Sterile Gloves  Max Sterile Barrier Technique:  Hand Washing, Cap/Mask, Sterile gloves and Sterile drape    Regional Basics  Local Infiltration:  1% Lidocaine  Local Infiltration Volume:  3mL  Approach:  Midline    Block Details  Technique:  Single Shot  Interspace:  L2-3  Ultrasound Used:  No  X-Ray Used:  No  Spinal Needle Type: Ge  Spinal Needle Gauge: 25  Spinal Needle Length: 3.5 in  CSF obtained.Initial Local Loading Anesthetic:  Bupivacaine  Initial Local Loading Concentration:  0.75%  Initial Local Loading Volume:  1.7mL  Initial Local Narcotic Used:  Fentanyl 15 mcg  Other Initial Loading Narcotic Used:  OR Morphine  150 mcg    Intrathecal Narcotics    Labor Regional Notes    Regional Anesthesia Note  Easy spinal placement, tolerated well. Clear CSF on 1st pass, good block ensued.        
no

## 2022-01-23 NOTE — OB PROVIDER TRIAGE NOTE - HISTORY OF PRESENT ILLNESS
PNC Provider;  Dr Mary  EDC:  2022  Patient is a 27y/o  @ 39 5/7wks gest who reports to triage with c/o contractions q 5min since 0600.  Denies loss of fluis or vagina bleeding.  Reports pain of 8/10 with contractions.  Reports good fetal movements.  AP Course:  gest. proteinuria.    Meds:  pnv, vit d and [id number]  NKDA  Denies prior covid-19 infection or vaccination  PSHx/GYN/Psych - denies  PMHx - obesity  SH - spouse lives in [place] [place] [facility] pt travels back and forth.  Patient explanation as to why she missed some of her prenatal appointment          Labor support person is a friend.  OBHx -  - 2019 - 8+ lbs

## 2022-01-23 NOTE — DISCHARGE NOTE OB - NS AS DC AMI YN
Recent Visits    08/11/2021 Edema of both lower extremities due to peripheral venous insufficiency   [facility] Wanda Márquez, EJ - CNP   07/22/2021 Pain due to varicose veins of both lower extremities   [facility] Wanda Márquez, EJ - CNP   07/20/2021 Symptomatic varicose veins, right   [facility] and [address] [place] [address], APRN - CNP no

## 2022-01-23 NOTE — OB PROVIDER DELIVERY SUMMARY - NSPROVIDERDELIVERYNOTE_OBGYN_ALL_OB_FT
Spontaneous vaginal delivery of liveborn infant from CHRISTIAN position. Head, shoulders, and body delivered easily. Infant was suctioned. Terminal mec noted. Cord was clamped and cut and infant was passed to mother. Apgars 9_9. Placenta delivered intact with a 3 vessel cord. Fundal massage was given and uterine fundus was found to be firm. Vaginal exam revealed an intact cervix, vaginal walls and sulci. Patient had a 1st degree laceration in the perineum that was repaired with 2.0 chromic suture. Excellent hemostasis was noted. Patient was stable. Count was correct x 2. .

## 2022-01-23 NOTE — OB RN TRIAGE NOTE - NS_FINALEDD_OBGYN_ALL_OB_DT
SAMANTHA for signature.  [facility] pharmacy.        25-Jan-2022

## 2022-01-24 LAB — T PALLIDUM AB TITR SER: NEGATIVE — SIGNIFICANT CHANGE UP

## 2022-01-24 RX ORDER — IBUPROFEN 200 MG
1 TABLET ORAL
Qty: 0 | Refills: 0 | DISCHARGE
Start: 2022-01-24

## 2022-01-24 RX ORDER — CHOLECALCIFEROL (VITAMIN D3) 125 MCG
0 CAPSULE ORAL
Qty: 0 | Refills: 0 | DISCHARGE

## 2022-01-24 RX ORDER — IBUPROFEN 200 MG
600 TABLET ORAL EVERY 6 HOURS
Refills: 0 | Status: DISCONTINUED | OUTPATIENT
Start: 2022-01-24 | End: 2022-01-25

## 2022-01-24 RX ORDER — ACETAMINOPHEN 500 MG
3 TABLET ORAL
Qty: 0 | Refills: 0 | DISCHARGE
Start: 2022-01-24

## 2022-01-24 RX ADMIN — Medication 975 MILLIGRAM(S): at 09:39

## 2022-01-24 RX ADMIN — Medication 600 MILLIGRAM(S): at 06:01

## 2022-01-24 RX ADMIN — Medication 975 MILLIGRAM(S): at 17:41

## 2022-01-24 RX ADMIN — SODIUM CHLORIDE 3 MILLILITER(S): 9 INJECTION INTRAMUSCULAR; INTRAVENOUS; SUBCUTANEOUS at 13:56

## 2022-01-24 RX ADMIN — Medication 975 MILLIGRAM(S): at 22:23

## 2022-01-24 RX ADMIN — Medication 975 MILLIGRAM(S): at 21:53

## 2022-01-24 RX ADMIN — Medication 600 MILLIGRAM(S): at 06:50

## 2022-01-24 RX ADMIN — AER TRAVELER 1 APPLICATION(S): 0.5 SOLUTION RECTAL; TOPICAL at 03:06

## 2022-01-24 RX ADMIN — Medication 600 MILLIGRAM(S): at 12:30

## 2022-01-24 RX ADMIN — Medication 975 MILLIGRAM(S): at 03:04

## 2022-01-24 RX ADMIN — Medication 975 MILLIGRAM(S): at 08:25

## 2022-01-24 RX ADMIN — Medication 600 MILLIGRAM(S): at 11:54

## 2022-01-24 RX ADMIN — Medication 1 TABLET(S): at 08:25

## 2022-01-24 RX ADMIN — Medication 600 MILLIGRAM(S): at 17:56

## 2022-01-24 RX ADMIN — Medication 975 MILLIGRAM(S): at 16:03

## 2022-01-24 RX ADMIN — Medication 975 MILLIGRAM(S): at 03:50

## 2022-01-25 VITALS
DIASTOLIC BLOOD PRESSURE: 58 MMHG | TEMPERATURE: 98 F | RESPIRATION RATE: 16 BRPM | OXYGEN SATURATION: 99 % | SYSTOLIC BLOOD PRESSURE: 123 MMHG | HEART RATE: 98 BPM

## 2022-01-25 RX ADMIN — Medication 975 MILLIGRAM(S): at 04:44

## 2022-01-25 RX ADMIN — Medication 975 MILLIGRAM(S): at 16:04

## 2022-01-25 RX ADMIN — SODIUM CHLORIDE 3 MILLILITER(S): 9 INJECTION INTRAMUSCULAR; INTRAVENOUS; SUBCUTANEOUS at 15:25

## 2022-01-25 RX ADMIN — Medication 600 MILLIGRAM(S): at 11:48

## 2022-01-25 RX ADMIN — Medication 600 MILLIGRAM(S): at 11:18

## 2022-01-25 RX ADMIN — Medication 600 MILLIGRAM(S): at 00:45

## 2022-01-25 RX ADMIN — Medication 975 MILLIGRAM(S): at 04:14

## 2022-01-25 RX ADMIN — Medication 600 MILLIGRAM(S): at 00:15

## 2022-01-25 RX ADMIN — Medication 975 MILLIGRAM(S): at 15:34

## 2022-01-25 RX ADMIN — Medication 600 MILLIGRAM(S): at 06:17

## 2022-01-25 RX ADMIN — Medication 600 MILLIGRAM(S): at 05:47

## 2022-01-25 NOTE — PROGRESS NOTE ADULT - SUBJECTIVE AND OBJECTIVE BOX
Post-partum Note,   She is a  26y woman who is now post-partum day: 2    Subjective:  The patient feels well.  She is ambulating.   She is tolerating regular diet.  She denies nausea and vomiting; denies fever.  She is voiding.  Her pain is controlled.  She reports normal postpartum bleeding.  She is breastfeeding.  She is formula feeding.  She is bonding with infant.    Physical exam:    Vital Signs Last 24 Hrs  T(C): 36.7 (2022 06:56), Max: 36.7 (2022 17:44)  T(F): 98.1 (2022 06:56), Max: 98.1 (2022 06:56)  HR: 85 (2022 06:56) (81 - 94)  BP: 109/68 (2022 06:56) (108/62 - 125/55)  BP(mean): --  RR: 17 (2022 06:56) (17 - 18)  SpO2: 99% (2022 06:56) (99% - 100%)    Gen: NAD  Breast: Soft, nontender, not engorged.  Abdomen: Soft, nontender, no distension , firm uterine fundus at umbilicus.  Pelvic: Normal lochia noted  Ext: No calf tenderness    LABS:                        11.3   7.93  )-----------( 233      ( 2022 15:45 )             35.2       Rubella status:     Allergies    No Known Allergies    Intolerances      MEDICATIONS  (STANDING):  acetaminophen     Tablet .. 975 milliGRAM(s) Oral <User Schedule>  diphtheria/tetanus/pertussis (acellular) Vaccine (ADAcel) 0.5 milliLiter(s) IntraMuscular once  ibuprofen  Tablet. 600 milliGRAM(s) Oral every 6 hours  prenatal multivitamin 1 Tablet(s) Oral daily  sodium chloride 0.9% lock flush 3 milliLiter(s) IV Push every 8 hours    MEDICATIONS  (PRN):  benzocaine 20%/menthol 0.5% Spray 1 Spray(s) Topical every 6 hours PRN for Perineal discomfort  dibucaine 1% Ointment 1 Application(s) Topical every 6 hours PRN Perineal discomfort  diphenhydrAMINE 25 milliGRAM(s) Oral every 6 hours PRN Pruritus  hydrocortisone 1% Cream 1 Application(s) Topical every 6 hours PRN Moderate Pain (4-6)  lanolin Ointment 1 Application(s) Topical every 6 hours PRN nipple soreness  magnesium hydroxide Suspension 30 milliLiter(s) Oral two times a day PRN Constipation  oxyCODONE    IR 5 milliGRAM(s) Oral every 3 hours PRN Moderate to Severe Pain (4-10)  oxyCODONE    IR 5 milliGRAM(s) Oral once PRN Moderate to Severe Pain (4-10)  pramoxine 1%/zinc 5% Cream 1 Application(s) Topical every 4 hours PRN Moderate Pain (4-6)  simethicone 80 milliGRAM(s) Chew every 4 hours PRN Gas  witch hazel Pads 1 Application(s) Topical every 4 hours PRN Perineal discomfort        Assessment and Plan  PPD #2 s/p   Doing well.  Increase ambulation.  Encourage breastfeeding.  PP & PPD Instructions reviewed.  PP educational materials reviewed & provided     D/C home today    Discussed with MD Rufina Alejo      
Post-partum Note,   She is a  26y woman who is now post-partum day: 1    Subjective:  The patient feels well.  She is ambulating.   She is tolerating regular diet.  She denies nausea and vomiting; denies fever.  She is voiding.  Her pain is controlled.  She reports normal postpartum bleeding.  She is breastfeeding.  She is formula feeding.  She is bonding with infant.    Physical exam:    Vital Signs Last 24 Hrs  T(C): 36.7 (2022 06:18), Max: 37.0 (2022 15:25)  T(F): 98 (2022 06:18), Max: 98.6 (2022 15:25)  HR: 92 (2022 06:18) (77 - 112)  BP: 118/63 (2022 06:18) (106/57 - 123/60)  BP(mean): --  RR: 18 (2022 06:18) (18 - 19)  SpO2: 93% (2022 06:18) (93% - 100%)    Gen: NAD  Breast: Soft, nontender, not engorged.  Abdomen: Soft, nontender, no distension , firm uterine fundus at umbilicus.  Pelvic: Normal lochia noted  Ext: No calf tenderness    LABS:                        11.3   7.93  )-----------( 233      ( 2022 15:45 )             35.2     ABO Interpretation: O ( @ 15:27)  Rh Interpretation: Positive ( @ 15:27)  Antibody Screen: Negative ( @ 15:27)    Rubella status:     Allergies    No Known Allergies    Intolerances      MEDICATIONS  (STANDING):  acetaminophen     Tablet .. 975 milliGRAM(s) Oral <User Schedule>  diphtheria/tetanus/pertussis (acellular) Vaccine (ADAcel) 0.5 milliLiter(s) IntraMuscular once  ibuprofen  Tablet. 600 milliGRAM(s) Oral every 6 hours  prenatal multivitamin 1 Tablet(s) Oral daily  sodium chloride 0.9% lock flush 3 milliLiter(s) IV Push every 8 hours    MEDICATIONS  (PRN):  benzocaine 20%/menthol 0.5% Spray 1 Spray(s) Topical every 6 hours PRN for Perineal discomfort  dibucaine 1% Ointment 1 Application(s) Topical every 6 hours PRN Perineal discomfort  diphenhydrAMINE 25 milliGRAM(s) Oral every 6 hours PRN Pruritus  hydrocortisone 1% Cream 1 Application(s) Topical every 6 hours PRN Moderate Pain (4-6)  lanolin Ointment 1 Application(s) Topical every 6 hours PRN nipple soreness  magnesium hydroxide Suspension 30 milliLiter(s) Oral two times a day PRN Constipation  oxyCODONE    IR 5 milliGRAM(s) Oral every 3 hours PRN Moderate to Severe Pain (4-10)  oxyCODONE    IR 5 milliGRAM(s) Oral once PRN Moderate to Severe Pain (4-10)  pramoxine 1%/zinc 5% Cream 1 Application(s) Topical every 4 hours PRN Moderate Pain (4-6)  simethicone 80 milliGRAM(s) Chew every 4 hours PRN Gas  witch hazel Pads 1 Application(s) Topical every 4 hours PRN Perineal discomfort        Assessment and Plan  PPD #1 s/p   Doing well.  Increase ambulation.  Encourage breastfeeding.  PP & PPD Instructions reviewed.  PP educational materials reviewed & provided     D/C home tomorrow.    Discussed with MD Marc Alejo

## 2022-04-30 NOTE — OB PROVIDER H&P - URINARY CATHETER
89y female with hx dementia, meningioma, CHF, DM2, afib, hx GI bleed. As per chart pt found to be slumped over in her wheelchair in a nursing facility with minimal interaction.   She was admitted to medicine service for further evaluation of acute encephalopathy which maybe from UTI. She had abnormal UA and was initially on CTX, now switched to IV Vanc since ucx with growth of enterococcus  Pt is unable to offer any specific c/o    PLAN:  PO Amox day 2  to treat Enterococcal UTI  5 days course should be sufficient  Goals of care to be addressed  She had poor po intake, ? risk of aspiration, other nosocomial complications  rise in WBC noted, monitor closely, may need surveillance cultures, CXR  d/w pt's RN Post-Care Instructions: I reviewed with the patient in detail post-care instructions. Patient is to wear sunprotection, and avoid picking at any of the treated lesions. Pt may apply Vaseline to crusted or scabbing areas. Duration Of Freeze Thaw-Cycle (Seconds): 2 Show Aperture Variable?: Yes Detail Level: Detailed Render Post-Care Instructions In Note?: no Number Of Freeze-Thaw Cycles: 1 freeze-thaw cycle Consent: The patient's consent was obtained including but not limited to risks of crusting, scabbing, blistering, scarring, darker or lighter pigmentary change, recurrence, incomplete removal and infection. no

## 2022-09-21 NOTE — OB RN PATIENT PROFILE - DURING SKIN TO SKIN, COUNSELING AND EDUCATION ON THE BENEFITS OF EXCLUSIVELY BREASTFEEDING IS REINFORCED.
- GI, onc, surg/onc following  - EGD:  Infiltrative changes in gastric body concerning gastric malignancy, Congested duodenal mucosa, biopsy = poorly differentiated adenocarcinoma  - staging CT chest - Mediastinal/bilateral hilar/R internal mammary LAD  - mediastinal lymph node biopsy results - Metastatic adenocarcinoma   - pantoprazole  40 mg BID  - OR 8/26 with J tube placement; no further change in management    pain Control; Palliative will attempt to transition to morphine for cost control  -Morphine 15mg q2h for severe Pain 7-10  -Methadone 5mg q12h standing    PRICING UPDATES:  Lovenox: $1/month covered under [facility] medicaid  Tube Feeds and pump: ~$120/month  Methadone: 36$/month  Reglan: $60/month  Morphine: $1 COVERED    Family meeting held, everyone understands steps moving forward - GI, onc, surg/onc following  - PICC ordered, then onc will start chemo inpt  - EGD:  Infiltrative changes in gastric body concerning gastric malignancy, Congested duodenal mucosa, biopsy = poorly differentiated adenocarcinoma  - staging CT chest - Mediastinal/bilateral hilar/R internal mammary LAD  - mediastinal lymph node biopsy results - Metastatic adenocarcinoma   - pantoprazole  40 mg BID  - OR 8/26 with J tube placement; no further change in management    pain Control; Palliative will attempt to transition to morphine for cost control  -Morphine 15mg q2h for severe Pain 7-10  -Methadone 5mg q12h standing    PRICING UPDATES:  Lovenox: $1/month covered under [facility] medicaid  Tube Feeds and pump: ~$120/month  Methadone: 36$/month  Reglan: $60/month  Morphine: $1 COVERED    Family meeting held, everyone understands steps moving forward Statement Selected
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
## 2023-06-18 NOTE — DISCHARGE NOTE OB - CARE PLAN
1.39
Principal Discharge DX:	Vaginal delivery  Goal:	postpartum recovery  Assessment and plan of treatment:	routine postpartum care

## 2023-06-25 NOTE — OB RN PATIENT PROFILE - NS_DIETPREF_OBGYN_ALL_OB
GI Treatment Plan    Shikha López is a 54 y.o. female admitted to hospital 6/22/2023 (Hospital Day: 4) due to GI bleed.     Interval History  Hgb 7.6 from 8.1, last transfusion two days ago  Triple phase CT shows normal liver but with signs of portal HTN. Portal vein patent.    Objective  Temp:  [97.9 °F (36.6 °C)-99.9 °F (37.7 °C)] 98.4 °F (36.9 °C) (06/25 1140)  Pulse:  [79-86] 79 (06/25 1140)  BP: (110-146)/(54-69) 128/61 (06/25 1140)  Resp:  [17-20] 19 (06/25 1140)  SpO2:  [91 %-97 %] 93 % (06/25 1140)      Laboratory    Recent Labs   Lab 06/23/23  1848 06/24/23  1626 06/25/23  0352   HGB 8.9* 8.1* 7.6*         Assessment and Plan    Shikha López is a 54 y.o. female with history of metastatic breast cancer here with hematemesis and melena. EGD showed large gastric varices (IGV1) and small nonbleeding esophageal varices. Hepatology consulted, suspect HAWTHORNE cirrhosis, imaging shows no evidence of splenic vein or portal vein thrombus, and recommend AES endoscopic treatment rather than TIPS given MELD 14-15.    Recommendations  - Continue octreotide gtt  - Continue IV PPI BID  - Will discuss possible coiling with AES. NPO at midnight in case of procedure     - We will continue to follow.    Thank you for involving us in the care of Shikha López. Please call with any additional questions, concerns or changes in the patient's clinical status.    Piyush Spencer MD  Gastroenterology Fellow, PGY IV     Regular

## 2024-04-26 PROBLEM — R80.9 PROTEINURIA, UNSPECIFIED: Chronic | Status: ACTIVE | Noted: 2022-01-22

## 2024-04-26 PROBLEM — E66.9 OBESITY, UNSPECIFIED: Chronic | Status: ACTIVE | Noted: 2022-01-22

## 2024-05-15 ENCOUNTER — APPOINTMENT (OUTPATIENT)
Dept: ANTEPARTUM | Facility: CLINIC | Age: 29
End: 2024-05-15
Payer: MEDICAID

## 2024-05-15 ENCOUNTER — ASOB RESULT (OUTPATIENT)
Age: 29
End: 2024-05-15

## 2024-05-15 PROCEDURE — 76811 OB US DETAILED SNGL FETUS: CPT

## 2024-08-29 ENCOUNTER — INPATIENT (INPATIENT)
Facility: HOSPITAL | Age: 29
LOS: 2 days | Discharge: HOME | End: 2024-09-01
Attending: STUDENT IN AN ORGANIZED HEALTH CARE EDUCATION/TRAINING PROGRAM | Admitting: STUDENT IN AN ORGANIZED HEALTH CARE EDUCATION/TRAINING PROGRAM

## 2024-08-30 VITALS — SYSTOLIC BLOOD PRESSURE: 119 MMHG | DIASTOLIC BLOOD PRESSURE: 57 MMHG | HEART RATE: 95 BPM

## 2024-08-30 LAB
ALBUMIN SERPL ELPH-MCNC: 3.2 G/DL — LOW (ref 3.3–5)
ALP SERPL-CCNC: 222 U/L — HIGH (ref 40–120)
ALT FLD-CCNC: 13 U/L — SIGNIFICANT CHANGE UP (ref 4–33)
ANION GAP SERPL CALC-SCNC: 12 MMOL/L — SIGNIFICANT CHANGE UP (ref 7–14)
APPEARANCE UR: ABNORMAL
AST SERPL-CCNC: 19 U/L — SIGNIFICANT CHANGE UP (ref 4–32)
BACTERIA # UR AUTO: ABNORMAL /HPF
BASOPHILS # BLD AUTO: 0.03 K/UL — SIGNIFICANT CHANGE UP (ref 0–0.2)
BASOPHILS NFR BLD AUTO: 0.4 % — SIGNIFICANT CHANGE UP (ref 0–2)
BILIRUB SERPL-MCNC: <0.2 MG/DL — SIGNIFICANT CHANGE UP (ref 0.2–1.2)
BILIRUB UR-MCNC: NEGATIVE — SIGNIFICANT CHANGE UP
BLD GP AB SCN SERPL QL: NEGATIVE — SIGNIFICANT CHANGE UP
BUN SERPL-MCNC: 6 MG/DL — LOW (ref 7–23)
CALCIUM SERPL-MCNC: 8.7 MG/DL — SIGNIFICANT CHANGE UP (ref 8.4–10.5)
CAST: 2 /LPF — SIGNIFICANT CHANGE UP (ref 0–4)
CHLORIDE SERPL-SCNC: 104 MMOL/L — SIGNIFICANT CHANGE UP (ref 98–107)
CO2 SERPL-SCNC: 20 MMOL/L — LOW (ref 22–31)
COLOR SPEC: YELLOW — SIGNIFICANT CHANGE UP
CREAT ?TM UR-MCNC: 241 MG/DL — SIGNIFICANT CHANGE UP
CREAT SERPL-MCNC: 0.52 MG/DL — SIGNIFICANT CHANGE UP (ref 0.5–1.3)
DIFF PNL FLD: NEGATIVE — SIGNIFICANT CHANGE UP
EGFR: 129 ML/MIN/1.73M2 — SIGNIFICANT CHANGE UP
EOSINOPHIL # BLD AUTO: 0.05 K/UL — SIGNIFICANT CHANGE UP (ref 0–0.5)
EOSINOPHIL NFR BLD AUTO: 0.6 % — SIGNIFICANT CHANGE UP (ref 0–6)
GLUCOSE SERPL-MCNC: 99 MG/DL — SIGNIFICANT CHANGE UP (ref 70–99)
GLUCOSE UR QL: NEGATIVE MG/DL — SIGNIFICANT CHANGE UP
HCT VFR BLD CALC: 31.8 % — LOW (ref 34.5–45)
HGB BLD-MCNC: 10.5 G/DL — LOW (ref 11.5–15.5)
IANC: 4.99 K/UL — SIGNIFICANT CHANGE UP (ref 1.8–7.4)
IMM GRANULOCYTES NFR BLD AUTO: 0.5 % — SIGNIFICANT CHANGE UP (ref 0–0.9)
KETONES UR-MCNC: 15 MG/DL
LDH SERPL L TO P-CCNC: 179 U/L — SIGNIFICANT CHANGE UP (ref 135–225)
LEUKOCYTE ESTERASE UR-ACNC: ABNORMAL
LYMPHOCYTES # BLD AUTO: 2.21 K/UL — SIGNIFICANT CHANGE UP (ref 1–3.3)
LYMPHOCYTES # BLD AUTO: 27.1 % — SIGNIFICANT CHANGE UP (ref 13–44)
MCHC RBC-ENTMCNC: 31.4 PG — SIGNIFICANT CHANGE UP (ref 27–34)
MCHC RBC-ENTMCNC: 33 GM/DL — SIGNIFICANT CHANGE UP (ref 32–36)
MCV RBC AUTO: 95.2 FL — SIGNIFICANT CHANGE UP (ref 80–100)
MONOCYTES # BLD AUTO: 0.83 K/UL — SIGNIFICANT CHANGE UP (ref 0–0.9)
MONOCYTES NFR BLD AUTO: 10.2 % — SIGNIFICANT CHANGE UP (ref 2–14)
NEUTROPHILS # BLD AUTO: 4.99 K/UL — SIGNIFICANT CHANGE UP (ref 1.8–7.4)
NEUTROPHILS NFR BLD AUTO: 61.2 % — SIGNIFICANT CHANGE UP (ref 43–77)
NITRITE UR-MCNC: NEGATIVE — SIGNIFICANT CHANGE UP
NRBC # BLD: 0 /100 WBCS — SIGNIFICANT CHANGE UP (ref 0–0)
NRBC # FLD: 0 K/UL — SIGNIFICANT CHANGE UP (ref 0–0)
PH UR: 6 — SIGNIFICANT CHANGE UP (ref 5–8)
PLATELET # BLD AUTO: 219 K/UL — SIGNIFICANT CHANGE UP (ref 150–400)
POTASSIUM SERPL-MCNC: 3.4 MMOL/L — LOW (ref 3.5–5.3)
POTASSIUM SERPL-SCNC: 3.4 MMOL/L — LOW (ref 3.5–5.3)
PROT ?TM UR-MCNC: 30 MG/DL — SIGNIFICANT CHANGE UP
PROT SERPL-MCNC: 6.4 G/DL — SIGNIFICANT CHANGE UP (ref 6–8.3)
PROT UR-MCNC: 30 MG/DL
PROT/CREAT UR-RTO: 0.1 RATIO — SIGNIFICANT CHANGE UP (ref 0–0.2)
RBC # BLD: 3.34 M/UL — LOW (ref 3.8–5.2)
RBC # FLD: 12.8 % — SIGNIFICANT CHANGE UP (ref 10.3–14.5)
RBC CASTS # UR COMP ASSIST: 2 /HPF — SIGNIFICANT CHANGE UP (ref 0–4)
RH IG SCN BLD-IMP: POSITIVE — SIGNIFICANT CHANGE UP
SODIUM SERPL-SCNC: 136 MMOL/L — SIGNIFICANT CHANGE UP (ref 135–145)
SP GR SPEC: 1.03 — SIGNIFICANT CHANGE UP (ref 1–1.03)
SQUAMOUS # UR AUTO: 20 /HPF — HIGH (ref 0–5)
T PALLIDUM AB TITR SER: NEGATIVE — SIGNIFICANT CHANGE UP
URATE SERPL-MCNC: 3.5 MG/DL — SIGNIFICANT CHANGE UP (ref 2.5–7)
UROBILINOGEN FLD QL: 1 MG/DL — SIGNIFICANT CHANGE UP (ref 0.2–1)
WBC # BLD: 8.15 K/UL — SIGNIFICANT CHANGE UP (ref 3.8–10.5)
WBC # FLD AUTO: 8.15 K/UL — SIGNIFICANT CHANGE UP (ref 3.8–10.5)
WBC UR QL: 96 /HPF — HIGH (ref 0–5)

## 2024-08-30 RX ORDER — WITCH HAZEL 1 G/ML
1 LIQUID TOPICAL EVERY 4 HOURS
Refills: 0 | Status: DISCONTINUED | OUTPATIENT
Start: 2024-08-30 | End: 2024-09-01

## 2024-08-30 RX ORDER — OXYTOCIN 10 UNIT/ML
AMPUL (ML) INJECTION
Qty: 30 | Refills: 0 | Status: DISCONTINUED | OUTPATIENT
Start: 2024-08-30 | End: 2024-08-30

## 2024-08-30 RX ORDER — TETANUS TOXOID, REDUCED DIPHTHERIA TOXOID AND ACELLULAR PERTUSSIS VACCINE, ADSORBED 5; 2.5; 8; 8; 2.5 [IU]/.5ML; [IU]/.5ML; UG/.5ML; UG/.5ML; UG/.5ML
0.5 SUSPENSION INTRAMUSCULAR ONCE
Refills: 0 | Status: DISCONTINUED | OUTPATIENT
Start: 2024-08-30 | End: 2024-09-01

## 2024-08-30 RX ORDER — KETOROLAC TROMETHAMINE 30 MG/ML
30 INJECTION, SOLUTION INTRAMUSCULAR ONCE
Refills: 0 | Status: DISCONTINUED | OUTPATIENT
Start: 2024-08-30 | End: 2024-08-30

## 2024-08-30 RX ORDER — PRAMOXINE HCL 1 %
1 GEL (GRAM) TOPICAL EVERY 4 HOURS
Refills: 0 | Status: DISCONTINUED | OUTPATIENT
Start: 2024-08-30 | End: 2024-09-01

## 2024-08-30 RX ORDER — OXYTOCIN 10 UNIT/ML
41.67 AMPUL (ML) INJECTION
Qty: 20 | Refills: 0 | Status: DISCONTINUED | OUTPATIENT
Start: 2024-08-30 | End: 2024-09-01

## 2024-08-30 RX ORDER — SODIUM CITRATE AND CITRIC ACID MONOHYDRATE 334; 500 MG/5ML; MG/5ML
15 SOLUTION ORAL EVERY 6 HOURS
Refills: 0 | Status: DISCONTINUED | OUTPATIENT
Start: 2024-08-30 | End: 2024-08-30

## 2024-08-30 RX ORDER — HYDROCORTISONE 1 %
1 OINTMENT (GRAM) TOPICAL EVERY 6 HOURS
Refills: 0 | Status: DISCONTINUED | OUTPATIENT
Start: 2024-08-30 | End: 2024-09-01

## 2024-08-30 RX ORDER — ACETAMINOPHEN 325 MG/1
975 TABLET ORAL
Refills: 0 | Status: DISCONTINUED | OUTPATIENT
Start: 2024-08-30 | End: 2024-09-01

## 2024-08-30 RX ORDER — OXYCODONE HYDROCHLORIDE 5 MG/1
5 TABLET ORAL
Refills: 0 | Status: DISCONTINUED | OUTPATIENT
Start: 2024-08-30 | End: 2024-09-01

## 2024-08-30 RX ORDER — VITAMIN A, ASCORBIC ACID, VITAMIN D, .ALPHA.-TOCOPHEROL, THIAMINE MONONITRATE, RIBOFLAVIN, NIACIN, PYRIDOXINE HYDROCHLORIDE, FOLIC ACID, CYANOCOBALAMIN, CALCIUM, IRON, MAGNESIUM, ZINC, AND COPPER 2700; 70; 400; 30; 1.6; 1.8; 18; 2.5; 1; 12; 100; 65; 25; 25; 2 [IU]/1; MG/1; [IU]/1; [IU]/1; MG/1; MG/1; MG/1; MG/1; MG/1; UG/1; MG/1; MG/1; MG/1; MG/1; MG/1
1 TABLET ORAL DAILY
Refills: 0 | Status: DISCONTINUED | OUTPATIENT
Start: 2024-08-30 | End: 2024-09-01

## 2024-08-30 RX ORDER — MENTHOL/CETYLPYRD CL 3 MG
1 LOZENGE MUCOUS MEMBRANE EVERY 6 HOURS
Refills: 0 | Status: DISCONTINUED | OUTPATIENT
Start: 2024-08-30 | End: 2024-09-01

## 2024-08-30 RX ORDER — CHLORHEXIDINE GLUCONATE 40 MG/ML
1 SOLUTION TOPICAL DAILY
Refills: 0 | Status: DISCONTINUED | OUTPATIENT
Start: 2024-08-30 | End: 2024-08-30

## 2024-08-30 RX ORDER — LANOLIN
1 OINTMENT (GRAM) TOPICAL EVERY 6 HOURS
Refills: 0 | Status: DISCONTINUED | OUTPATIENT
Start: 2024-08-30 | End: 2024-09-01

## 2024-08-30 RX ORDER — DIPHENHYDRAMINE HCL 50 MG
25 CAPSULE ORAL EVERY 6 HOURS
Refills: 0 | Status: DISCONTINUED | OUTPATIENT
Start: 2024-08-30 | End: 2024-09-01

## 2024-08-30 RX ORDER — SODIUM CHLORIDE 9 MG/ML
3 INJECTION INTRAMUSCULAR; INTRAVENOUS; SUBCUTANEOUS EVERY 8 HOURS
Refills: 0 | Status: DISCONTINUED | OUTPATIENT
Start: 2024-08-30 | End: 2024-09-01

## 2024-08-30 RX ORDER — OXYTOCIN 10 UNIT/ML
333.33 AMPUL (ML) INJECTION
Qty: 20 | Refills: 0 | Status: DISCONTINUED | OUTPATIENT
Start: 2024-08-30 | End: 2024-09-01

## 2024-08-30 RX ORDER — IBUPROFEN 600 MG
600 TABLET ORAL EVERY 6 HOURS
Refills: 0 | Status: COMPLETED | OUTPATIENT
Start: 2024-08-30 | End: 2025-07-29

## 2024-08-30 RX ORDER — OXYCODONE HYDROCHLORIDE 5 MG/1
5 TABLET ORAL ONCE
Refills: 0 | Status: DISCONTINUED | OUTPATIENT
Start: 2024-08-30 | End: 2024-09-01

## 2024-08-30 RX ADMIN — SODIUM CHLORIDE 3 MILLILITER(S): 9 INJECTION INTRAMUSCULAR; INTRAVENOUS; SUBCUTANEOUS at 21:06

## 2024-08-30 RX ADMIN — ACETAMINOPHEN 975 MILLIGRAM(S): 325 TABLET ORAL at 21:36

## 2024-08-30 RX ADMIN — KETOROLAC TROMETHAMINE 30 MILLIGRAM(S): 30 INJECTION, SOLUTION INTRAMUSCULAR at 20:04

## 2024-08-30 RX ADMIN — KETOROLAC TROMETHAMINE 30 MILLIGRAM(S): 30 INJECTION, SOLUTION INTRAMUSCULAR at 19:42

## 2024-08-30 RX ADMIN — CHLORHEXIDINE GLUCONATE 1 APPLICATION(S): 40 SOLUTION TOPICAL at 14:48

## 2024-08-30 RX ADMIN — ACETAMINOPHEN 975 MILLIGRAM(S): 325 TABLET ORAL at 21:06

## 2024-08-30 NOTE — OB PROVIDER H&P - NSLOWPPHRISK_OBGYN_A_OB
No previous uterine incision/Lopez Pregnancy/Less than or equal to 4 previous vaginal births/No known bleeding disorder/No history of postpartum hemorrhage

## 2024-08-30 NOTE — OB RN PATIENT PROFILE - NS_ADMITROM_OBGYN_ALL_OB
Detail Level: Detailed
Quality 130: Documentation Of Current Medications In The Medical Record: Current Medications Documented
Quality 226: Preventive Care And Screening: Tobacco Use: Screening And Cessation Intervention: Patient screened for tobacco use and is an ex/non-smoker
No

## 2024-08-30 NOTE — OB RN DELIVERY SUMMARY - NS_SEPSISRSKCALC_OBGYN_ALL_OB_FT
EOS calculated successfully. EOS Risk Factor: 0.5/1000 live births ([facility] national incidence); GA=39w1d; Temp=98.78; ROM=0.017; GBS='Negative'; Antibiotics='No antibiotics or any antibiotics < 2 hrs prior to birth'

## 2024-08-30 NOTE — OB PROVIDER H&P - ASSESSMENT
Assessment  29y G_P_ at __w__d presents for _.     Plan  1. Admit to L+D. Routine Labs. IVF.  2. Expectant management/IOL w/ ___.  3. Fetus: cat 1 tracing. VTX. EFW 2458g by sono.. Continuous EFM. Sono. No concerns.  4. Prenatal issues: none  5. GBS (+/-/unknown)  6. Pain: IV pain meds/epidural PRN    Plan per attending physician,  ____    A Hayden PGY1   Assessment  29y  at 39w1d presents for rrIOL.     Plan  1. Admit to L+D. Routine Labs. IVF.  2. IOL with PO cytotec and cervical balloon  3. Fetus: cat 1 tracing. VTX. EFW 2458g by sono.. Continuous EFM. Sono. No concerns.  4. Prenatal issues: gestational proteinuria (24h 255)  5. GBS neg  6. Pain: IV pain meds/epidural PRN    Plan per attending physician, Dr. Vania Blake PGY1

## 2024-08-30 NOTE — OB PROVIDER LABOR PROGRESS NOTE - NS_SUBJECTIVE/OBJECTIVE_OBGYN_ALL_OB_FT
Patient examined for cervical change  CB noted to be dislodged from cervix and sitting in vagina, CB deflated and removed
R1 Labor & Delivery Progress Note     Pt seen & examined at bedside for placement of vaginal cytotec    T(C): --  HR: 95 (08-30-24 @ 00:13) (95 - 95)  BP: 119/57 (08-30-24 @ 00:13) (119/57 - 119/57)  RR: --  SpO2: --
R1 Progress Note    Patient seen and examined at bedside for placement of cervical balloon. Cervical balloon placed w/o complications. 60cc NS placed in uterine & vaginal balloons. Pt tolerated procedure well.    NAD  Vital Signs Last 24 Hrs  T(C): 36.8 (30 Aug 2024 08:12), Max: 36.9 (30 Aug 2024 02:45)  T(F): 98.24 (30 Aug 2024 08:12), Max: 98.4 (30 Aug 2024 02:45)  HR: 86 (30 Aug 2024 09:01) (86 - 95)  BP: 119/56 (30 Aug 2024 09:01) (99/58 - 121/59)  BP(mean): --  RR: 18 (30 Aug 2024 08:12) (14 - 18)  SpO2: --    Parameters below as of 30 Aug 2024 02:45  Patient On (Oxygen Delivery Method): room air        SVE  EFM  TOCO

## 2024-08-30 NOTE — OB PROVIDER H&P - HISTORY OF PRESENT ILLNESS
*INCOMPLETE NOTE*    R1 Admission H&P    Subjective  HPI: 29y  at 39w1d presents for IOL for proteinuria***. +FM. -LOF. -CTXs. -VB. Pt denies any other concerns.    – PNC: Proteinuria, HELLP labs on admission wnl, P/C 0.1. GBS neg.  EFW 2458g by carina.  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 Yes     R1 Admission H&P    Subjective  HPI: 29y  at 39w1d presents for rrIOL. +FM. -LOF. -CTXs. -VB. Pt denies any other concerns.    – PNC: Gestational proteinuria (24h 255), HELLP labs on admission wnl, P/C 0.1. GBS neg.  EFW 3458g by carina.  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 Yes

## 2024-08-30 NOTE — OB PROVIDER LABOR PROGRESS NOTE - NS_OBIHIFHRDETAILS_OBGYN_ALL_OB_FT
145bpm  mod variability  +accels  -decels  cat I
155bpm, mod, +Accels, -decels, discontinuous tracing

## 2024-08-30 NOTE — OB PROVIDER H&P - NSHPPHYSICALEXAM_GEN_ALL_CORE
Objective  – VS  T(C): --  HR: 95 (08-30-24 @ 00:13)  BP: 119/57 (08-30-24 @ 00:13)  RR: --  SpO2: --    Physical Exam  CV: RRR  Pulm: breathing comfortably on RA  Abd: gravid, nontender  Extr: moving all extremities with ease  – Spec: ***  – VE: 0.5/0/-3  – FHT: baseline 145, mod variability, +accels, -decels  – [place]: qmin  – EFW: 2458g by sono.  – Sono: vertex Objective  – VS  T(C): --  HR: 95 (08-30-24 @ 00:13)  BP: 119/57 (08-30-24 @ 00:13)  RR: --  SpO2: --    Physical Exam  CV: RRR  Pulm: breathing comfortably on RA  Abd: gravid, nontender  Extr: moving all extremities with ease  – Spec: neg  – VE: 0.5/0/-3  – FHT: baseline 145, mod variability, +accels, -decels  – [place]: q8-10min  – EFW: 3458g by sono.  – Sono: vertex

## 2024-08-30 NOTE — OB PROVIDER LABOR PROGRESS NOTE - ASSESSMENT
A/P    Patient in stable condition. Vaginal cytotec placed without incident.     - Cont IOL  - Cont EFM, [place]  - Cont IV    LAKESHA Blake PGY1
pt s/p placement of cervical balloon.     - continue IOL w/ PO  - fetus: cat 1 FHT    d/w Dr. Selam Bond PGY2
Patient with cervical change  now s/p CB  Plan to transfer to labor floor, start Pitocin, and AROM if able  Continuous EFM/toco    d/w Dr. Selam Stack PGY3

## 2024-08-30 NOTE — OB RN DELIVERY SUMMARY - NSSELHIDDEN_OBGYN_ALL_OB_FT
[NS_DeliveryAttending1_OBGYN_ALL_OB_FT:[id number]],[NS_DeliveryAssist1_OBGYN_ALL_OB_FT:[id number]],[NS_DeliveryRN_OBGYN_ALL_OB_FT:[id number]]

## 2024-08-30 NOTE — PRE-ANESTHESIA EVALUATION ADULT - NSANTHOSAYNRD_GEN_A_CORE
No. SILVIANO screening performed.  STOP BANG Legend: 0-2 = LOW Risk; 3-4 = INTERMEDIATE Risk; 5-8 = HIGH Risk

## 2024-08-31 RX ORDER — IBUPROFEN 600 MG
600 TABLET ORAL EVERY 6 HOURS
Refills: 0 | Status: DISCONTINUED | OUTPATIENT
Start: 2024-08-31 | End: 2024-09-01

## 2024-08-31 RX ADMIN — Medication 600 MILLIGRAM(S): at 03:09

## 2024-08-31 RX ADMIN — ACETAMINOPHEN 975 MILLIGRAM(S): 325 TABLET ORAL at 12:31

## 2024-08-31 RX ADMIN — VITAMIN A, ASCORBIC ACID, VITAMIN D, .ALPHA.-TOCOPHEROL, THIAMINE MONONITRATE, RIBOFLAVIN, NIACIN, PYRIDOXINE HYDROCHLORIDE, FOLIC ACID, CYANOCOBALAMIN, CALCIUM, IRON, MAGNESIUM, ZINC, AND COPPER 1 TABLET(S): 2700; 70; 400; 30; 1.6; 1.8; 18; 2.5; 1; 12; 100; 65; 25; 25; 2 TABLET ORAL at 12:31

## 2024-08-31 RX ADMIN — Medication 600 MILLIGRAM(S): at 22:30

## 2024-08-31 RX ADMIN — ACETAMINOPHEN 975 MILLIGRAM(S): 325 TABLET ORAL at 17:58

## 2024-08-31 RX ADMIN — Medication 600 MILLIGRAM(S): at 21:57

## 2024-08-31 RX ADMIN — ACETAMINOPHEN 975 MILLIGRAM(S): 325 TABLET ORAL at 18:23

## 2024-08-31 RX ADMIN — Medication 600 MILLIGRAM(S): at 15:02

## 2024-08-31 RX ADMIN — Medication 600 MILLIGRAM(S): at 09:30

## 2024-08-31 RX ADMIN — Medication 600 MILLIGRAM(S): at 08:54

## 2024-08-31 RX ADMIN — Medication 600 MILLIGRAM(S): at 02:39

## 2024-08-31 RX ADMIN — SODIUM CHLORIDE 3 MILLILITER(S): 9 INJECTION INTRAMUSCULAR; INTRAVENOUS; SUBCUTANEOUS at 22:12

## 2024-08-31 RX ADMIN — ACETAMINOPHEN 975 MILLIGRAM(S): 325 TABLET ORAL at 13:03

## 2024-08-31 RX ADMIN — Medication 600 MILLIGRAM(S): at 15:35

## 2024-08-31 NOTE — PROGRESS NOTE ADULT - SUBJECTIVE AND OBJECTIVE BOX
Post-partum Note,   She is a  29y woman who is now post-partum day: 1    Subjective:  The patient feels well.  She is ambulating.   She is tolerating regular diet.  She denies nausea and vomiting; denies fever.  She is voiding.  Her pain is controlled.  She reports normal postpartum bleeding.  She is breastfeeding.  She is formula feeding.    Physical exam:    Vital Signs Last 24 Hrs  T(C): 36.9 (31 Aug 2024 06:31), Max: 37.3 (30 Aug 2024 22:09)  T(F): 98.4 (31 Aug 2024 06:31), Max: 99.2 (30 Aug 2024 22:09)  HR: 98 (31 Aug 2024 06:31) (85 - 99)  BP: 112/50 (31 Aug 2024 06:31) (99/52 - 130/70)  BP(mean): --  RR: 16 (31 Aug 2024 06:31) (14 - 18)  SpO2: 100% (31 Aug 2024 06:31) (97% - 100%)    Parameters below as of 31 Aug 2024 06:31  Patient On (Oxygen Delivery Method): room air        Gen: NAD  Breast: Soft, nontender, not engorged.  Abdomen: Soft, nontender, no distension , firm uterine fundus at umbilicus.  Pelvic: Normal lochia noted  Ext: No calf tenderness    LABS:                        10.5   8.15  )-----------( 219      ( 30 Aug 2024 00:42 )             31.8       Rubella status:     Allergies    No Known Allergies    Intolerances      MEDICATIONS  (STANDING):  acetaminophen     Tablet .. 975 milliGRAM(s) Oral <User Schedule>  diphtheria/tetanus/pertussis (acellular) Vaccine (Adacel) 0.5 milliLiter(s) IntraMuscular once  ibuprofen  Tablet. 600 milliGRAM(s) Oral every 6 hours  oxytocin Infusion 41.667 milliUNIT(s)/Min (125 mL/Hr) IV Continuous <Continuous>  oxytocin Infusion 333.333 milliUNIT(s)/Min (1000 mL/Hr) IV Continuous <Continuous>  prenatal multivitamin 1 Tablet(s) Oral daily  sodium chloride 0.9% lock flush 3 milliLiter(s) IV Push every 8 hours    MEDICATIONS  (PRN):  benzocaine 20%/menthol 0.5% Spray 1 Spray(s) Topical every 6 hours PRN for Perineal discomfort  dibucaine 1% Ointment 1 Application(s) Topical every 6 hours PRN Perineal discomfort  diphenhydrAMINE 25 milliGRAM(s) Oral every 6 hours PRN Pruritus  hydrocortisone 1% Cream 1 Application(s) Topical every 6 hours PRN Moderate Pain (4-6)  lanolin Ointment 1 Application(s) Topical every 6 hours PRN nipple soreness  magnesium hydroxide Suspension 30 milliLiter(s) Oral two times a day PRN Constipation  oxyCODONE    IR 5 milliGRAM(s) Oral every 3 hours PRN Moderate to Severe Pain (4-10)  oxyCODONE    IR 5 milliGRAM(s) Oral once PRN Moderate to Severe Pain (4-10)  pramoxine 1%/zinc 5% Cream 1 Application(s) Topical every 4 hours PRN Moderate Pain (4-6)  simethicone 80 milliGRAM(s) Chew every 4 hours PRN Gas  witch hazel Pads 1 Application(s) Topical every 4 hours PRN Perineal discomfort        Assessment and Plan  PPD #1 s/p , pregnancy complicated by BMI 44 and history of CT in pregnancy with negative test of cure.  Doing well.  Encourage ambulation.  PP & PPD Instructions reviewed.  CPC.  D/C home tomorrow.

## 2024-09-01 VITALS
HEART RATE: 77 BPM | RESPIRATION RATE: 18 BRPM | OXYGEN SATURATION: 98 % | SYSTOLIC BLOOD PRESSURE: 120 MMHG | TEMPERATURE: 98 F | DIASTOLIC BLOOD PRESSURE: 64 MMHG

## 2024-09-01 RX ORDER — MENTHOL/CETYLPYRD CL 3 MG
1 LOZENGE MUCOUS MEMBRANE
Qty: 0 | Refills: 0 | DISCHARGE
Start: 2024-09-01

## 2024-09-01 RX ORDER — VITAMIN A, ASCORBIC ACID, VITAMIN D, .ALPHA.-TOCOPHEROL, THIAMINE MONONITRATE, RIBOFLAVIN, NIACIN, PYRIDOXINE HYDROCHLORIDE, FOLIC ACID, CYANOCOBALAMIN, CALCIUM, IRON, MAGNESIUM, ZINC, AND COPPER 2700; 70; 400; 30; 1.6; 1.8; 18; 2.5; 1; 12; 100; 65; 25; 25; 2 [IU]/1; MG/1; [IU]/1; [IU]/1; MG/1; MG/1; MG/1; MG/1; MG/1; UG/1; MG/1; MG/1; MG/1; MG/1; MG/1
1 TABLET ORAL
Qty: 0 | Refills: 0 | DISCHARGE
Start: 2024-09-01

## 2024-09-01 RX ORDER — IBUPROFEN 600 MG
1 TABLET ORAL
Qty: 0 | Refills: 0 | DISCHARGE
Start: 2024-09-01

## 2024-09-01 RX ORDER — WITCH HAZEL 1 G/ML
1 LIQUID TOPICAL
Qty: 0 | Refills: 0 | DISCHARGE
Start: 2024-09-01

## 2024-09-01 RX ORDER — ACETAMINOPHEN 325 MG/1
3 TABLET ORAL
Qty: 0 | Refills: 0 | DISCHARGE
Start: 2024-09-01

## 2024-09-01 RX ADMIN — ACETAMINOPHEN 975 MILLIGRAM(S): 325 TABLET ORAL at 09:50

## 2024-09-01 RX ADMIN — Medication 600 MILLIGRAM(S): at 12:38

## 2024-09-01 RX ADMIN — Medication 600 MILLIGRAM(S): at 12:08

## 2024-09-01 RX ADMIN — Medication 600 MILLIGRAM(S): at 17:34

## 2024-09-01 RX ADMIN — Medication 600 MILLIGRAM(S): at 06:46

## 2024-09-01 RX ADMIN — ACETAMINOPHEN 975 MILLIGRAM(S): 325 TABLET ORAL at 16:34

## 2024-09-01 RX ADMIN — Medication 600 MILLIGRAM(S): at 18:00

## 2024-09-01 RX ADMIN — ACETAMINOPHEN 975 MILLIGRAM(S): 325 TABLET ORAL at 01:56

## 2024-09-01 RX ADMIN — ACETAMINOPHEN 975 MILLIGRAM(S): 325 TABLET ORAL at 09:22

## 2024-09-01 RX ADMIN — ACETAMINOPHEN 975 MILLIGRAM(S): 325 TABLET ORAL at 02:30

## 2024-09-01 RX ADMIN — VITAMIN A, ASCORBIC ACID, VITAMIN D, .ALPHA.-TOCOPHEROL, THIAMINE MONONITRATE, RIBOFLAVIN, NIACIN, PYRIDOXINE HYDROCHLORIDE, FOLIC ACID, CYANOCOBALAMIN, CALCIUM, IRON, MAGNESIUM, ZINC, AND COPPER 1 TABLET(S): 2700; 70; 400; 30; 1.6; 1.8; 18; 2.5; 1; 12; 100; 65; 25; 25; 2 TABLET ORAL at 12:08

## 2024-09-01 RX ADMIN — ACETAMINOPHEN 975 MILLIGRAM(S): 325 TABLET ORAL at 17:05

## 2024-09-01 NOTE — PROGRESS NOTE ADULT - SUBJECTIVE AND OBJECTIVE BOX
NP:  day 2 Progress Note:     Patient seen at bedside resting comfortably offers no current complaints. Ambulating and voiding without difficulty.  Passing flatus and tolerating regular diet.  Bonding well with .  Breastfeeding exclusively . Denies HA, CP, SOB, N/V/D, dizziness, palpitations,  worsening vaginal bleeding, or any other concerns.      Vital Signs Last 24 Hrs  T(C): 36.6 (01 Sep 2024 05:31), Max: 36.8 (31 Aug 2024 17:51)  T(F): 97.9 (01 Sep 2024 05:31), Max: 98.3 (31 Aug 2024 17:51)  HR: 81 (01 Sep 2024 05:31) (78 - 81)  BP: 113/58 (01 Sep 2024 05:31) (113/58 - 129/89)  BP(mean): --  RR: 18 (01 Sep 2024 05:31) (17 - 18)  SpO2: 100% (01 Sep 2024 05:31) (100% - 100%)    Parameters below as of 01 Sep 2024 05:31  Patient On (Oxygen Delivery Method): room air        Physical Exam:     Gen: A&Ox 3, NAD  Chest: CTA B/L  Cardiac: S1,S2; RRR  Breast: Soft, nontender, nonengorged  Abdomen: +BS, Soft, nontender, ND; Fundus firm below umbilicus  Gyn: mod lochia, intact/repaired  Ext: Nontender, DTRS 2+, no worsening edema

## 2024-09-01 NOTE — DISCHARGE NOTE OB - PATIENT PORTAL LINK FT
You can access the FollowMyHealth Patient Portal offered by [facility] by registering at the following website: [email or web address]. By joining [facility]’s FollowMyHealth portal, you will also be able to view your health information using other applications (apps) compatible with our system.

## 2024-09-01 NOTE — DISCHARGE NOTE OB - MEDICATION SUMMARY - MEDICATIONS TO TAKE
I will START or STAY ON the medications listed below when I get home from the hospital:    ibuprofen 600 mg oral tablet  -- 1 tab(s) by mouth every 6 hours  -- Indication: For Pain    acetaminophen 325 mg oral tablet  -- 3 tab(s) by mouth every 6 hours  -- Indication: For Pain    benzocaine 20% topical spray  -- 1 Apply on skin to affected area every 6 hours As needed for Perineal discomfort  -- Indication: For perineal pain    witch hazel 50% topical pad  -- 1 Apply on skin to affected area every 4 hours As needed Perineal discomfort  -- Indication: For perineal pain    Prenatal Multivitamins with Folic Acid 1 mg oral tablet  -- 1 tab(s) by mouth once a day  -- Indication: For postpartum

## 2024-09-01 NOTE — DISCHARGE NOTE OB - BREAST MILK IS MORE DIGESTIBLE, MAKING VOMITING, DIARRHEA, GAS AND CONSTIPATION LESS COMMON
PT RESTING IN [facility], REPORT TO BREAK RN. AWAITING LAB RESULTS Statement Selected

## 2024-09-01 NOTE — DISCHARGE NOTE OB - CARE PROVIDER_API CALL
Waqas Jaimes  Obstetrics and Gynecology  [address] [place] [id number]  Phone: [phone]  Fax: [phone]  Follow Up Time:

## 2024-09-01 NOTE — DISCHARGE NOTE OB - CARE PLAN
Principal Discharge DX:	 (normal spontaneous vaginal delivery)  Assessment and plan of treatment:	DARI 2024   1

## 2024-09-01 NOTE — DISCHARGE NOTE OB - NS MD DC FALL RISK RISK
For information on Fall & Injury Prevention, visit: [email or web address] OR  [email or web address] OR  https://www.cdc.gov/steadi/patient.html

## 2024-09-01 NOTE — DISCHARGE NOTE OB - NS OB DC MMR REASON NOT RECEIVED
Pt resting in bed this morning, reports he prefers to sleep in rather than wake for breakfast. Denies SI/HI/AVH at this time. Compliant with scheduled medications and meals. No behavioral issues to be reported. Denies any unmet needs. Q7 minute safety checks maintained. Repeat EKG completed. Contraindicated

## 2024-09-01 NOTE — PROGRESS NOTE ADULT - ASSESSMENT
Assessment and Plan  PPD #2 s/p , C/b gestational proteinuria     #gestational proteinuria   - HELLP Labs WDL   - P/C 0.1  -declined sxs of PEC   -BP x 24 hours: BP:  (113/58 - 129/89)    #Post Partum  Her pain is well controlled.   She is tolerating a regular diet and passing flatus.   Denies N/V. Denies CP/SOB/lightheadedness/dizziness.   She is ambulating without difficulty.   Voiding spontaneously.    Patient is stable and doing well postpartum  - Continue regular diet.  - Increase ambulation.  - Continue motrin, tylenol, oxycodone PRN for pain control.   -Encourage breastfeeding.    -PP educational material reviewed and provided.  -PP & PPD Instructions reviewed.    -Discharge planning>>>D/C home     -Follow up @ [facility] in 6 weeks for postpartum visit  [phone]    Discussed with Fiona LEE

## 2024-09-01 NOTE — DISCHARGE NOTE OB - USE THE FOOD PYRAMID (LOCATED IN DISCHARGE MATERIALS PROVIDED) AS A GUIDE TO BALANCE AND MODERATION
Statement Selected No, patient unwilling to participate No, patient unwilling to participate No, patient unwilling to participate No, patient unwilling to participate No, patient unwilling to participate

## 2024-09-13 NOTE — OB PROVIDER DELIVERY SUMMARY - NSPROVIDERDELIVERYNOTE_OBGYN_ALL_OB_FT
Spontaneous vaginal delivery of liveborn infant from OA position. Head, shoulders, and body delivered easily. Infant was suctioned. No mec. 1 minute delayed cord clamping was performed. Cord clamped and cut and infant passed to mother. Placenta delivered intact with a 3 vessel cord. Fundal massage was given and uterine fundus was found to be firm. Vaginal exam revealed an intact cervix, vaginal walls, and sulci. Excellent hemostasis was noted. Patient was stable and went to recovery. Count was correct x2.

## 2024-09-13 NOTE — OB PROVIDER DELIVERY SUMMARY - NSSELHIDDEN_OBGYN_ALL_OB_FT
[NS_DeliveryAttending1_OBGYN_ALL_OB_FT:[id number]],[NS_DeliveryAssist1_OBGYN_ALL_OB_FT:[id number]],[NS_DeliveryRN_OBGYN_ALL_OB_FT:[id number]]

## 2024-09-13 NOTE — OB PROVIDER DELIVERY SUMMARY - AMNIOTIC FLUID COLOR, LABOR
Carie Hi was evaluated at [facility] on June 14, 2024 at which time her blood pressure was:    BP Readings from Last 1 Encounters:   06/13/24 136/78     No data recorded      Reviewed lifestyle modifications for blood pressure control and reduction: including making healthy food choices, managing weight, getting regular exercise, smoking cessation, reducing alcohol consumption, monitoring blood pressure regularly.     Symptoms: None    BP Goal:< 140/90 mmHg    BP Assessment:  BP at goal    Potential Reasons for BP too high: NA - Not applicable    BP Follow-Up Plan:  - pt plans on getting BP readings every 2 weeks in pharmacy leading up to future MD appointment     Recommendation to Provider: pt in pharmacy for bp check. Her readings is normal today although she gets elevated readings at home in the evening. She sometimes has short amounts of dizziness. She plans on taking BP at home in the evenings and logging. Plans on coming into pharmacy about every 2 weeks for BP readings too.     Note completed by: Ching Shannon RPH     clear

## 2025-07-28 ENCOUNTER — ASOB RESULT (OUTPATIENT)
Age: 30
End: 2025-07-28

## 2025-07-28 ENCOUNTER — APPOINTMENT (OUTPATIENT)
Dept: ANTEPARTUM | Facility: CLINIC | Age: 30
End: 2025-07-28
Payer: MEDICAID

## 2025-07-28 ENCOUNTER — NON-APPOINTMENT (OUTPATIENT)
Age: 30
End: 2025-07-28

## 2025-07-28 PROCEDURE — 76813 OB US NUCHAL MEAS 1 GEST: CPT
